# Patient Record
Sex: FEMALE | Race: WHITE | NOT HISPANIC OR LATINO
[De-identification: names, ages, dates, MRNs, and addresses within clinical notes are randomized per-mention and may not be internally consistent; named-entity substitution may affect disease eponyms.]

---

## 2017-01-16 ENCOUNTER — APPOINTMENT (OUTPATIENT)
Dept: CARDIOLOGY | Facility: CLINIC | Age: 72
End: 2017-01-16

## 2017-07-17 ENCOUNTER — APPOINTMENT (OUTPATIENT)
Dept: CARDIOLOGY | Facility: CLINIC | Age: 72
End: 2017-07-17

## 2017-07-25 ENCOUNTER — APPOINTMENT (OUTPATIENT)
Dept: CARDIOLOGY | Facility: CLINIC | Age: 72
End: 2017-07-25

## 2017-07-31 ENCOUNTER — APPOINTMENT (OUTPATIENT)
Dept: CARDIOLOGY | Facility: CLINIC | Age: 72
End: 2017-07-31
Payer: MEDICARE

## 2017-07-31 PROCEDURE — 99214 OFFICE O/P EST MOD 30 MIN: CPT

## 2018-01-16 ENCOUNTER — APPOINTMENT (OUTPATIENT)
Dept: CARDIOLOGY | Facility: CLINIC | Age: 73
End: 2018-01-16

## 2018-01-18 ENCOUNTER — APPOINTMENT (OUTPATIENT)
Dept: CARDIOLOGY | Facility: CLINIC | Age: 73
End: 2018-01-18
Payer: MEDICARE

## 2018-01-18 ENCOUNTER — NON-APPOINTMENT (OUTPATIENT)
Age: 73
End: 2018-01-18

## 2018-01-18 VITALS
HEART RATE: 66 BPM | WEIGHT: 147 LBS | SYSTOLIC BLOOD PRESSURE: 106 MMHG | BODY MASS INDEX: 23.63 KG/M2 | DIASTOLIC BLOOD PRESSURE: 70 MMHG | HEIGHT: 66 IN

## 2018-01-18 DIAGNOSIS — Z78.9 OTHER SPECIFIED HEALTH STATUS: ICD-10-CM

## 2018-01-18 DIAGNOSIS — Z81.1 FAMILY HISTORY OF ALCOHOL ABUSE AND DEPENDENCE: ICD-10-CM

## 2018-01-18 DIAGNOSIS — Z84.89 FAMILY HISTORY OF OTHER SPECIFIED CONDITIONS: ICD-10-CM

## 2018-01-18 PROCEDURE — 93000 ELECTROCARDIOGRAM COMPLETE: CPT

## 2018-01-18 PROCEDURE — 99214 OFFICE O/P EST MOD 30 MIN: CPT

## 2018-01-18 RX ORDER — UBIDECARENONE/VIT E ACET 100MG-5
1000 CAPSULE ORAL
Refills: 0 | Status: ACTIVE | COMMUNITY

## 2018-03-30 ENCOUNTER — NON-APPOINTMENT (OUTPATIENT)
Age: 73
End: 2018-03-30

## 2018-03-30 ENCOUNTER — APPOINTMENT (OUTPATIENT)
Dept: CARDIOLOGY | Facility: CLINIC | Age: 73
End: 2018-03-30
Payer: MEDICARE

## 2018-03-30 VITALS
HEIGHT: 66.5 IN | SYSTOLIC BLOOD PRESSURE: 110 MMHG | HEART RATE: 76 BPM | BODY MASS INDEX: 23.67 KG/M2 | WEIGHT: 149 LBS | DIASTOLIC BLOOD PRESSURE: 64 MMHG

## 2018-03-30 PROCEDURE — 99214 OFFICE O/P EST MOD 30 MIN: CPT

## 2018-03-30 PROCEDURE — 93000 ELECTROCARDIOGRAM COMPLETE: CPT

## 2018-03-30 RX ORDER — OFLOXACIN 3 MG/ML
0.3 SOLUTION/ DROPS OPHTHALMIC
Qty: 10 | Refills: 0 | Status: DISCONTINUED | COMMUNITY
Start: 2017-12-12

## 2018-03-30 RX ORDER — OSELTAMIVIR PHOSPHATE 75 MG/1
75 CAPSULE ORAL
Qty: 10 | Refills: 0 | Status: DISCONTINUED | COMMUNITY
Start: 2018-01-23

## 2018-03-30 RX ORDER — PREDNISOLONE ACETATE 10 MG/ML
1 SUSPENSION/ DROPS OPHTHALMIC
Qty: 15 | Refills: 0 | Status: DISCONTINUED | COMMUNITY
Start: 2017-12-12

## 2018-03-30 RX ORDER — BRIMONIDINE TARTRATE 1 MG/ML
0.1 SOLUTION/ DROPS OPHTHALMIC
Qty: 5 | Refills: 0 | Status: DISCONTINUED | COMMUNITY
Start: 2017-11-16

## 2018-04-16 ENCOUNTER — RX RENEWAL (OUTPATIENT)
Age: 73
End: 2018-04-16

## 2018-05-16 ENCOUNTER — APPOINTMENT (OUTPATIENT)
Dept: CARDIOLOGY | Facility: CLINIC | Age: 73
End: 2018-05-16
Payer: MEDICARE

## 2018-05-16 VITALS
OXYGEN SATURATION: 95 % | HEART RATE: 66 BPM | DIASTOLIC BLOOD PRESSURE: 60 MMHG | HEIGHT: 66.5 IN | WEIGHT: 150 LBS | BODY MASS INDEX: 23.82 KG/M2 | SYSTOLIC BLOOD PRESSURE: 100 MMHG

## 2018-05-16 DIAGNOSIS — Z86.19 PERSONAL HISTORY OF OTHER INFECTIOUS AND PARASITIC DISEASES: ICD-10-CM

## 2018-05-16 DIAGNOSIS — H26.9 UNSPECIFIED CATARACT: ICD-10-CM

## 2018-05-16 DIAGNOSIS — Z86.79 PERSONAL HISTORY OF OTHER DISEASES OF THE CIRCULATORY SYSTEM: ICD-10-CM

## 2018-05-16 PROCEDURE — 99214 OFFICE O/P EST MOD 30 MIN: CPT

## 2018-05-16 RX ORDER — PRAVASTATIN SODIUM 40 MG/1
40 TABLET ORAL
Refills: 0 | Status: DISCONTINUED | COMMUNITY
End: 2018-05-16

## 2018-11-21 ENCOUNTER — APPOINTMENT (OUTPATIENT)
Dept: CARDIOLOGY | Facility: CLINIC | Age: 73
End: 2018-11-21
Payer: MEDICARE

## 2018-11-21 VITALS
HEART RATE: 77 BPM | WEIGHT: 151 LBS | HEIGHT: 66.5 IN | BODY MASS INDEX: 23.98 KG/M2 | SYSTOLIC BLOOD PRESSURE: 112 MMHG | OXYGEN SATURATION: 99 % | DIASTOLIC BLOOD PRESSURE: 64 MMHG

## 2018-11-21 PROCEDURE — 99214 OFFICE O/P EST MOD 30 MIN: CPT

## 2018-11-21 RX ORDER — ALBUTEROL SULFATE 90 UG/1
108 (90 BASE) AEROSOL, METERED RESPIRATORY (INHALATION)
Qty: 18 | Refills: 0 | Status: DISCONTINUED | COMMUNITY
Start: 2018-01-23 | End: 2018-11-21

## 2018-11-21 NOTE — HISTORY OF PRESENT ILLNESS
[FreeTextEntry1] : \par •essential hypertension, stable. no chf, no ckd, non smoker\par \par \par •Dyslipidemia. Take pravastatin inetermitantly along with lifestyle modifications.  Notes minor tolerable spasms in her legs and feet which last for "seconds". \par \par \par •Murmur related to increased valvular regurgitation. No CHF. non rheumatic valvular abnormality. Physiologic.\par \par \par •hypothyroidism on synthroid followed by endocrionology Dr. martinez stable evaluation in 1/16\par \par \par

## 2018-11-21 NOTE — REASON FOR VISIT
[Follow-Up - Clinic] : a clinic follow-up of [Hyperlipidemia] : hyperlipidemia [Hypertension] : hypertension [Palpitations] : palpitations [FreeTextEntry1] : 73-year-old. \par  Intermittent palpitations. Mainly, when she is stressed out. No associated symptoms of chest pain, shortness of breath, dizziness, or syncopal episode.\par No chest pain.\par Very active.\par Stable. Diet.\par Stable. Weight\par Stable. CPAP and management\par

## 2018-11-21 NOTE — PHYSICAL EXAM
[General Appearance - Well Developed] : well developed [Normal Appearance] : normal appearance [Well Groomed] : well groomed [General Appearance - Well Nourished] : well nourished [No Deformities] : no deformities [General Appearance - In No Acute Distress] : no acute distress [FreeTextEntry1] : no JVD [Respiration, Rhythm And Depth] : normal respiratory rhythm and effort [Exaggerated Use Of Accessory Muscles For Inspiration] : no accessory muscle use [Auscultation Breath Sounds / Voice Sounds] : lungs were clear to auscultation bilaterally [Heart Rate And Rhythm] : heart rate and rhythm were normal [Heart Sounds] : normal S1 and S2 [Murmurs] : no murmurs present [Abdomen Soft] : soft [Abdomen Tenderness] : non-tender [Abdomen Mass (___ Cm)] : no abdominal mass palpated [Abnormal Walk] : normal gait [Gait - Sufficient For Exercise Testing] : the gait was sufficient for exercise testing [Nail Clubbing] : no clubbing of the fingernails [Cyanosis, Localized] : no localized cyanosis [Petechial Hemorrhages (___cm)] : no petechial hemorrhages [Skin Color & Pigmentation] : normal skin color and pigmentation [] : no rash [No Venous Stasis] : no venous stasis [Skin Lesions] : no skin lesions [No Skin Ulcers] : no skin ulcer [No Xanthoma] : no  xanthoma was observed [Oriented To Time, Place, And Person] : oriented to person, place, and time [Affect] : the affect was normal [Mood] : the mood was normal [No Anxiety] : not feeling anxious

## 2018-11-21 NOTE — DISCUSSION/SUMMARY
[FreeTextEntry1] : #1CBC CMP, fasting lipid panel, hemoglobin A1c, TSH, urinalysis\par #2 essential hypertension. Now, very well-controlled p.o. of any medications. Continue like some modification.\par #3 hyperlipidemia. Continue statin therapy. Low saturated fat, carbohydrate intake. Taking it every other day. Fasting lipid panel is ordered.\par #4 hypothyroidism. followup stable TSH.\par #5 sleep apnea. A very well-controlled. Continue CPAP use.\par #6 preventive care reviewed. She will follow with gastroenterology. Ophthalmology. Endocrinology. And her gynecologist.\par \par Counseling regarding low saturated fat, low carbohydrate intake was reviewed. Active lifestyle and regular. Exercise is along with weight maintenance is advised.\par Counseling regarding low salt diet, active lifestyle, regular exercise, and weight maintenance was reviewed.\par \par All the above were at length reviewed. Answered all the questions. Thank you very much for this kind referral. Please do not hesitate to give me a call for any question.\par Part of this transcription was done with voice recognition software and phonetically similar errors are common. I apologize for that. Please donot hesitate to call for any questions due to above.\par \par Followup is planned 6 months

## 2018-11-21 NOTE — REVIEW OF SYSTEMS
[Shortness Of Breath] : no shortness of breath [Dyspnea on exertion] : not dyspnea during exertion [Chest  Pressure] : no chest pressure [Chest Pain] : no chest pain [Lower Ext Edema] : no extremity edema [Leg Claudication] : no intermittent leg claudication [Palpitations] : palpitations [Negative] : Heme/Lymph

## 2018-11-21 NOTE — ASSESSMENT
[FreeTextEntry1] : Echo 5/24/16 EF 60%. \par \par EKG requested by today 3/30/18 NSR 72bpm\par \par Preventive care. She did receive flu vaccine. She is seeing gynecologist be a colonoscopy 2014. Pneumonia vaccine 2014 and subsequently 2017 ( Prevnar-13).\par Labs from May 18, 2018 reviewed

## 2019-03-12 ENCOUNTER — APPOINTMENT (OUTPATIENT)
Dept: CARDIOLOGY | Facility: CLINIC | Age: 74
End: 2019-03-12
Payer: MEDICARE

## 2019-03-12 VITALS
HEART RATE: 62 BPM | SYSTOLIC BLOOD PRESSURE: 118 MMHG | HEIGHT: 66 IN | WEIGHT: 150 LBS | BODY MASS INDEX: 24.11 KG/M2 | OXYGEN SATURATION: 99 % | DIASTOLIC BLOOD PRESSURE: 68 MMHG

## 2019-03-12 PROCEDURE — 99214 OFFICE O/P EST MOD 30 MIN: CPT

## 2019-03-12 NOTE — REVIEW OF SYSTEMS
[see HPI] : see HPI [Shortness Of Breath] : no shortness of breath [Dyspnea on exertion] : not dyspnea during exertion [Chest  Pressure] : no chest pressure [Chest Pain] : no chest pain [Lower Ext Edema] : lower extremity edema [Leg Claudication] : no intermittent leg claudication [Palpitations] : no palpitations [Joint Pain] : joint pain [Joint Swelling] : joint swelling [Joint Stiffness] : joint stiffness [Negative] : Heme/Lymph

## 2019-03-12 NOTE — PHYSICAL EXAM
[General Appearance - Well Developed] : well developed [Normal Appearance] : normal appearance [Well Groomed] : well groomed [General Appearance - Well Nourished] : well nourished [No Deformities] : no deformities [General Appearance - In No Acute Distress] : no acute distress [Respiration, Rhythm And Depth] : normal respiratory rhythm and effort [Exaggerated Use Of Accessory Muscles For Inspiration] : no accessory muscle use [Auscultation Breath Sounds / Voice Sounds] : lungs were clear to auscultation bilaterally [Heart Rate And Rhythm] : heart rate and rhythm were normal [Heart Sounds] : normal S1 and S2 [Murmurs] : no murmurs present [FreeTextEntry1] : Left calf region  varicose veins noted. A mild swelling of the medial aspect of left knee. No signs of infection. No signs of superficial DVT. No calf tenderness. [Abdomen Soft] : soft [Abdomen Tenderness] : non-tender [Abdomen Mass (___ Cm)] : no abdominal mass palpated [Abnormal Walk] : normal gait [Gait - Sufficient For Exercise Testing] : the gait was sufficient for exercise testing [Nail Clubbing] : no clubbing of the fingernails [Cyanosis, Localized] : no localized cyanosis [Petechial Hemorrhages (___cm)] : no petechial hemorrhages [Skin Color & Pigmentation] : normal skin color and pigmentation [] : no rash [No Venous Stasis] : no venous stasis [Skin Lesions] : no skin lesions [No Skin Ulcers] : no skin ulcer [No Xanthoma] : no  xanthoma was observed [Oriented To Time, Place, And Person] : oriented to person, place, and time [Affect] : the affect was normal [Mood] : the mood was normal [No Anxiety] : not feeling anxious

## 2019-03-12 NOTE — DISCUSSION/SUMMARY
[FreeTextEntry1] : Left lower extremity swelling in the calf region, probably related to varicose veins or synovial cyst. Recommended left lower extremity venous Doppler study if no significant venous abnormality, then to consider orthopedic evaluation.\par If any significant worsening recommended to go to the emergency room .\par Rest of the management as discussed in the past.

## 2019-03-12 NOTE — REASON FOR VISIT
[Acute Exacerbation] : an acute exacerbation of [FreeTextEntry1] : 72 years old. Comes in for urgent evaluation, as well as 2 months. She has left calf swelling without redness, more in association, medial aspect of her left knee.\par There is no rash. No claudication. Intermittent severe atypia, more week. Ambulation. No joint swelling. Does she does have arthritic pain with movement of the left knee and walking.\par It is progressive.\par No ankle swelling. No shortness of breath, PND, orthopnea.

## 2019-03-15 ENCOUNTER — APPOINTMENT (OUTPATIENT)
Dept: CARDIOLOGY | Facility: CLINIC | Age: 74
End: 2019-03-15

## 2019-04-04 ENCOUNTER — RX RENEWAL (OUTPATIENT)
Age: 74
End: 2019-04-04

## 2019-05-16 ENCOUNTER — NON-APPOINTMENT (OUTPATIENT)
Age: 74
End: 2019-05-16

## 2019-05-16 ENCOUNTER — APPOINTMENT (OUTPATIENT)
Dept: CARDIOLOGY | Facility: CLINIC | Age: 74
End: 2019-05-16
Payer: MEDICARE

## 2019-05-16 VITALS
DIASTOLIC BLOOD PRESSURE: 64 MMHG | BODY MASS INDEX: 23.78 KG/M2 | WEIGHT: 148 LBS | HEIGHT: 66 IN | HEART RATE: 74 BPM | OXYGEN SATURATION: 99 % | SYSTOLIC BLOOD PRESSURE: 108 MMHG

## 2019-05-16 PROCEDURE — 93000 ELECTROCARDIOGRAM COMPLETE: CPT

## 2019-05-16 PROCEDURE — 99214 OFFICE O/P EST MOD 30 MIN: CPT

## 2019-05-16 RX ORDER — PRAVASTATIN SODIUM 40 MG/1
40 TABLET ORAL
Qty: 45 | Refills: 3 | Status: DISCONTINUED | COMMUNITY
Start: 2018-04-16 | End: 2019-05-16

## 2019-05-16 NOTE — REVIEW OF SYSTEMS
[Shortness Of Breath] : no shortness of breath [Dyspnea on exertion] : not dyspnea during exertion [Chest Pain] : no chest pain [Chest  Pressure] : no chest pressure [Lower Ext Edema] : no extremity edema [Leg Claudication] : no intermittent leg claudication [Palpitations] : palpitations [Negative] : Heme/Lymph

## 2019-05-16 NOTE — DISCUSSION/SUMMARY
[FreeTextEntry1] : #1CBC CMP, fasting lipid panel, hemoglobin A1c, TSH, urinalysis\par #2 essential hypertension. Now, very well-controlled off  any medications. Continue like some modification.\par #3 hyperlipidemia. She does not want to take statin therapy because of myalgia. She will continue to follow dietary restrictions and follow up on labs in one year. She understands risks and benefits. She does not have any active cardiovascular problems\par #4 hypothyroidism. followup stable TSH.\par #5 sleep apnea. A very well-controlled. Continue CPAP use.\par #6 preventive care reviewed. She will follow with gastroenterology. Ophthalmology. Endocrinology. And her gynecologist.\par \par Counseling regarding low saturated fat, low carbohydrate intake was reviewed. Active lifestyle and regular. Exercise is along with weight maintenance is advised.\par Counseling regarding low salt diet, active lifestyle, regular exercise, and weight maintenance was reviewed.\par \par All the above were at length reviewed. Answered all the questions. Thank you very much for this kind referral. Please do not hesitate to give me a call for any question.\par Part of this transcription was done with voice recognition software and phonetically similar errors are common. I apologize for that. Please donot hesitate to call for any questions due to above.\par \par Followup is planned 6 months

## 2019-05-16 NOTE — REASON FOR VISIT
[Follow-Up - Clinic] : a clinic follow-up of [Hypertension] : hypertension [Hyperlipidemia] : hyperlipidemia [Palpitations] : palpitations [FreeTextEntry1] : 73-year-old female comes in for followup consultation. Her left leg pain has improved.\par She has Baker's cyst B. and probable varicose veins.\par She was seen by orthopedic surgery. No further evaluation needed.\par She ultrasound, which showed Baker's cyst. No DVT.\par She is here to review her labs.\par  Intermittent palpitations. Mainly, when she is stressed out. No associated symptoms of chest pain, shortness of breath, dizziness, or syncopal episode.\par No chest pain.\par Very active.\par Stable. Diet.\par Stable. Weight\par Stable. CPAP and management\par

## 2019-05-16 NOTE — ASSESSMENT
[FreeTextEntry1] : Echo 5/24/16 EF 60%. \par \par EKG requested by today 3/30/18 NSR 72bpm\par \par Preventive care. She did receive flu vaccine. She is seeing gynecologist be a colonoscopy 2014. Pneumonia vaccine 2014 and subsequently 2017 ( Prevnar-13).\par Labs from May 18, 2018 reviewed\par \par Reviewed on May 16, 2019\par EKG TM is 16-17. Normal sinus rhythm.\par Labs were all reviewed that showed stable CBC, CMP, hemoglobin A1c, urinalysis.\par Her thyroid profile is being followed with

## 2019-05-16 NOTE — PHYSICAL EXAM
[General Appearance - Well Developed] : well developed [Normal Appearance] : normal appearance [Well Groomed] : well groomed [General Appearance - Well Nourished] : well nourished [No Deformities] : no deformities [General Appearance - In No Acute Distress] : no acute distress [FreeTextEntry1] : no JVD [Respiration, Rhythm And Depth] : normal respiratory rhythm and effort [Exaggerated Use Of Accessory Muscles For Inspiration] : no accessory muscle use [Auscultation Breath Sounds / Voice Sounds] : lungs were clear to auscultation bilaterally [Heart Rate And Rhythm] : heart rate and rhythm were normal [Heart Sounds] : normal S1 and S2 [Murmurs] : no murmurs present [Abdomen Soft] : soft [Abdomen Tenderness] : non-tender [Abdomen Mass (___ Cm)] : no abdominal mass palpated [Gait - Sufficient For Exercise Testing] : the gait was sufficient for exercise testing [Abnormal Walk] : normal gait [Nail Clubbing] : no clubbing of the fingernails [Petechial Hemorrhages (___cm)] : no petechial hemorrhages [Cyanosis, Localized] : no localized cyanosis [Skin Color & Pigmentation] : normal skin color and pigmentation [No Venous Stasis] : no venous stasis [] : no rash [Skin Lesions] : no skin lesions [No Xanthoma] : no  xanthoma was observed [No Skin Ulcers] : no skin ulcer [Oriented To Time, Place, And Person] : oriented to person, place, and time [Affect] : the affect was normal [Mood] : the mood was normal [No Anxiety] : not feeling anxious

## 2019-11-13 ENCOUNTER — APPOINTMENT (OUTPATIENT)
Dept: CARDIOLOGY | Facility: CLINIC | Age: 74
End: 2019-11-13
Payer: MEDICARE

## 2019-11-13 VITALS
HEART RATE: 68 BPM | SYSTOLIC BLOOD PRESSURE: 132 MMHG | OXYGEN SATURATION: 97 % | WEIGHT: 151 LBS | BODY MASS INDEX: 24.27 KG/M2 | HEIGHT: 66 IN | DIASTOLIC BLOOD PRESSURE: 60 MMHG

## 2019-11-13 PROCEDURE — 99214 OFFICE O/P EST MOD 30 MIN: CPT

## 2019-11-13 NOTE — HISTORY OF PRESENT ILLNESS
[FreeTextEntry1] : HPI:\par •essential hypertension, stable. no chf, no ckd, non smoker\par \par •Dyslipidemia. Take pravastatin inetermitantly along with lifestyle modifications.  Notes minor tolerable spasms in her legs and feet which last for "seconds". \par \par •Murmur related to increased valvular regurgitation. No CHF. non rheumatic valvular abnormality. Physiologic.\par \par •hypothyroidism on synthroid followed by endocrionology Dr. martinez stable evaluation in 1/16\par \par Significant osteoarthritis. Being followed by orthopedic surgery. Bilateral knee. Baker's cyst.\par \par Preventive care. She has had her flu vaccine this year only. She had 2 pneumonia vaccines. She follows with gynecologist on a regular basis. She had colonoscopy in 2019

## 2019-11-13 NOTE — DISCUSSION/SUMMARY
[FreeTextEntry1] : #1CBC CMP, lipid panel, TSH ordered\par #2 essential hypertension. Now, very well-controlled off  any medications. Continue like some modification.\par #3 hyperlipidemia. She does not want to take statin therapy because of myalgia. She will continue to follow dietary restrictions and follow up on labs in one year. She understands risks and benefits. She does not have any active cardiovascular problems\par #4 hypothyroidism. followup stable TSH.\par #5 sleep apnea. A very well-controlled. Continue CPAP use.\par #6 preventive care reviewed. She will follow with gastroenterology. Ophthalmology. Endocrinology. And her gynecologist.\par #7 significant osteoarthritis. Right knee swelling. Effusion. Evaluation management with orthopedic surgery as planned tomorrow.\par \par Counseling regarding low saturated fat, low carbohydrate intake was reviewed. Active lifestyle and regular. Exercise is along with weight maintenance is advised.\par Counseling regarding low salt diet, active lifestyle, regular exercise, and weight maintenance was reviewed.\par \par All the above were at length reviewed. Answered all the questions. Thank you very much for this kind referral. Please do not hesitate to give me a call for any question.\par Part of this transcription was done with voice recognition software and phonetically similar errors are common. I apologize for that. Please donot hesitate to call for any questions due to above.\par \par Followup is planned 6 months

## 2019-11-13 NOTE — REASON FOR VISIT
[Follow-Up - Clinic] : a clinic follow-up of [Hyperlipidemia] : hyperlipidemia [Hypertension] : hypertension [Palpitations] : palpitations [FreeTextEntry1] : Right knee pain and swelling. She is to see orthopedic surgery. Limiting her functional status. Other than that no complaint of chest pain, shortness of breath, PND, orthopnea.\par No palpitation, dizziness, lightheadedness.\par Stable. Weight.\par No fever, chills, cough, or I. there is\par Very active.\par Stable. Diet.\par Stable. Weight\par Stable. CPAP and management\par

## 2019-11-13 NOTE — ASSESSMENT
[FreeTextEntry1] : Echo 5/24/16 EF 60%. \par \par EKG requested by today 3/30/18 NSR 72bpm\par \par Preventive care. She did receive flu vaccine. She is seeing gynecologist be a colonoscopy 2014. Pneumonia vaccine 2014 and subsequently 2017 ( Prevnar-13).\par Labs from May 18, 2018 reviewed\par \par Reviewed on May 16, 2019\par EKG. Normal sinus rhythm.\par Labs were all reviewed that showed stable CBC, CMP, hemoglobin A1c, urinalysis.\par Her thyroid profile is being followed with\par \par Reviewed on November 13, 2019.\par Labs May 2019 were reviewed

## 2019-11-13 NOTE — PHYSICAL EXAM
[General Appearance - Well Developed] : well developed [Well Groomed] : well groomed [General Appearance - Well Nourished] : well nourished [Normal Appearance] : normal appearance [General Appearance - In No Acute Distress] : no acute distress [No Deformities] : no deformities [Respiration, Rhythm And Depth] : normal respiratory rhythm and effort [Exaggerated Use Of Accessory Muscles For Inspiration] : no accessory muscle use [Auscultation Breath Sounds / Voice Sounds] : lungs were clear to auscultation bilaterally [Heart Sounds] : normal S1 and S2 [Heart Rate And Rhythm] : heart rate and rhythm were normal [Murmurs] : no murmurs present [Abdomen Soft] : soft [Abdomen Tenderness] : non-tender [Abdomen Mass (___ Cm)] : no abdominal mass palpated [Gait - Sufficient For Exercise Testing] : the gait was sufficient for exercise testing [Abnormal Walk] : normal gait [Nail Clubbing] : no clubbing of the fingernails [Skin Color & Pigmentation] : normal skin color and pigmentation [] : no rash [No Venous Stasis] : no venous stasis [Skin Lesions] : no skin lesions [No Xanthoma] : no  xanthoma was observed [No Skin Ulcers] : no skin ulcer [Oriented To Time, Place, And Person] : oriented to person, place, and time [Affect] : the affect was normal [Mood] : the mood was normal [No Anxiety] : not feeling anxious [Cyanosis, Localized] : no localized cyanosis [FreeTextEntry1] : Right knee swelling and tenderness

## 2019-11-13 NOTE — REVIEW OF SYSTEMS
[Palpitations] : palpitations [Negative] : Heme/Lymph [Shortness Of Breath] : no shortness of breath [Dyspnea on exertion] : not dyspnea during exertion [Chest  Pressure] : no chest pressure [Chest Pain] : no chest pain [Lower Ext Edema] : no extremity edema [Leg Claudication] : no intermittent leg claudication

## 2020-08-10 ENCOUNTER — NON-APPOINTMENT (OUTPATIENT)
Age: 75
End: 2020-08-10

## 2020-08-10 ENCOUNTER — APPOINTMENT (OUTPATIENT)
Dept: CARDIOLOGY | Facility: CLINIC | Age: 75
End: 2020-08-10
Payer: MEDICARE

## 2020-08-10 VITALS
SYSTOLIC BLOOD PRESSURE: 136 MMHG | TEMPERATURE: 97.1 F | HEIGHT: 66 IN | BODY MASS INDEX: 23.78 KG/M2 | HEART RATE: 68 BPM | OXYGEN SATURATION: 97 % | WEIGHT: 148 LBS | DIASTOLIC BLOOD PRESSURE: 76 MMHG

## 2020-08-10 PROCEDURE — 93000 ELECTROCARDIOGRAM COMPLETE: CPT

## 2020-08-10 PROCEDURE — 99214 OFFICE O/P EST MOD 30 MIN: CPT

## 2020-08-10 NOTE — REVIEW OF SYSTEMS
[Palpitations] : palpitations [Negative] : Heme/Lymph [see HPI] : see HPI [Joint Pain] : joint pain [Joint Swelling] : joint swelling [Joint Stiffness] : joint stiffness [Shortness Of Breath] : no shortness of breath [Dyspnea on exertion] : not dyspnea during exertion [Chest  Pressure] : no chest pressure [Chest Pain] : no chest pain [Lower Ext Edema] : no extremity edema [Leg Claudication] : no intermittent leg claudication

## 2020-08-10 NOTE — PHYSICAL EXAM
[General Appearance - Well Developed] : well developed [Normal Appearance] : normal appearance [Well Groomed] : well groomed [General Appearance - Well Nourished] : well nourished [No Deformities] : no deformities [General Appearance - In No Acute Distress] : no acute distress [Respiration, Rhythm And Depth] : normal respiratory rhythm and effort [Exaggerated Use Of Accessory Muscles For Inspiration] : no accessory muscle use [Auscultation Breath Sounds / Voice Sounds] : lungs were clear to auscultation bilaterally [Heart Rate And Rhythm] : heart rate and rhythm were normal [Heart Sounds] : normal S1 and S2 [Murmurs] : no murmurs present [Abdomen Tenderness] : non-tender [Abdomen Soft] : soft [Abdomen Mass (___ Cm)] : no abdominal mass palpated [Abnormal Walk] : normal gait [Gait - Sufficient For Exercise Testing] : the gait was sufficient for exercise testing [Nail Clubbing] : no clubbing of the fingernails [Cyanosis, Localized] : no localized cyanosis [No Venous Stasis] : no venous stasis [Skin Color & Pigmentation] : normal skin color and pigmentation [] : no rash [Skin Lesions] : no skin lesions [No Skin Ulcers] : no skin ulcer [No Xanthoma] : no  xanthoma was observed [Oriented To Time, Place, And Person] : oriented to person, place, and time [Affect] : the affect was normal [No Anxiety] : not feeling anxious [Mood] : the mood was normal [Arterial Pulses Normal] : the arterial pulses were normal [Veins - Varicosity Changes] : no varicosital changes were noted in the lower extremities [FreeTextEntry1] : Right knee swelling and tenderness

## 2020-08-10 NOTE — PHYSICAL EXAM
[General Appearance - Well Developed] : well developed [Well Groomed] : well groomed [Normal Appearance] : normal appearance [General Appearance - Well Nourished] : well nourished [General Appearance - In No Acute Distress] : no acute distress [No Deformities] : no deformities [Respiration, Rhythm And Depth] : normal respiratory rhythm and effort [Exaggerated Use Of Accessory Muscles For Inspiration] : no accessory muscle use [Auscultation Breath Sounds / Voice Sounds] : lungs were clear to auscultation bilaterally [Heart Rate And Rhythm] : heart rate and rhythm were normal [Heart Sounds] : normal S1 and S2 [Murmurs] : no murmurs present [Abdomen Soft] : soft [Abdomen Tenderness] : non-tender [Abdomen Mass (___ Cm)] : no abdominal mass palpated [Abnormal Walk] : normal gait [Gait - Sufficient For Exercise Testing] : the gait was sufficient for exercise testing [Nail Clubbing] : no clubbing of the fingernails [Cyanosis, Localized] : no localized cyanosis [Skin Color & Pigmentation] : normal skin color and pigmentation [No Venous Stasis] : no venous stasis [] : no rash [No Skin Ulcers] : no skin ulcer [No Xanthoma] : no  xanthoma was observed [Skin Lesions] : no skin lesions [Oriented To Time, Place, And Person] : oriented to person, place, and time [Affect] : the affect was normal [Mood] : the mood was normal [No Anxiety] : not feeling anxious [Arterial Pulses Normal] : the arterial pulses were normal [Veins - Varicosity Changes] : no varicosital changes were noted in the lower extremities [FreeTextEntry1] : Right knee swelling and tenderness

## 2020-08-10 NOTE — REVIEW OF SYSTEMS
[Palpitations] : palpitations [Negative] : Heme/Lymph [see HPI] : see HPI [Joint Pain] : joint pain [Joint Swelling] : joint swelling [Joint Stiffness] : joint stiffness [Shortness Of Breath] : no shortness of breath [Dyspnea on exertion] : not dyspnea during exertion [Chest  Pressure] : no chest pressure [Chest Pain] : no chest pain [Leg Claudication] : no intermittent leg claudication [Lower Ext Edema] : no extremity edema

## 2020-08-27 ENCOUNTER — APPOINTMENT (OUTPATIENT)
Dept: CARDIOLOGY | Facility: CLINIC | Age: 75
End: 2020-08-27
Payer: MEDICARE

## 2020-08-27 PROCEDURE — 93306 TTE W/DOPPLER COMPLETE: CPT

## 2020-11-10 ENCOUNTER — APPOINTMENT (OUTPATIENT)
Dept: CARDIOLOGY | Facility: CLINIC | Age: 75
End: 2020-11-10
Payer: MEDICARE

## 2020-11-10 VITALS
BODY MASS INDEX: 24.92 KG/M2 | WEIGHT: 146 LBS | DIASTOLIC BLOOD PRESSURE: 64 MMHG | HEART RATE: 72 BPM | SYSTOLIC BLOOD PRESSURE: 104 MMHG | OXYGEN SATURATION: 96 % | HEIGHT: 64 IN

## 2020-11-10 DIAGNOSIS — M79.89 OTHER SPECIFIED SOFT TISSUE DISORDERS: ICD-10-CM

## 2020-11-10 DIAGNOSIS — R01.1 CARDIAC MURMUR, UNSPECIFIED: ICD-10-CM

## 2020-11-10 DIAGNOSIS — Z87.898 PERSONAL HISTORY OF OTHER SPECIFIED CONDITIONS: ICD-10-CM

## 2020-11-10 PROCEDURE — 99214 OFFICE O/P EST MOD 30 MIN: CPT

## 2020-11-10 RX ORDER — ESTRADIOL 0.1 MG/G
0.1 CREAM VAGINAL
Qty: 42 | Refills: 0 | Status: DISCONTINUED | COMMUNITY
Start: 2019-01-18 | End: 2020-11-10

## 2020-11-10 NOTE — REVIEW OF SYSTEMS
[see HPI] : see HPI [Joint Pain] : joint pain [Joint Swelling] : joint swelling [Joint Stiffness] : joint stiffness [Negative] : Heme/Lymph

## 2020-11-10 NOTE — REASON FOR VISIT
[Follow-Up - Clinic] : a clinic follow-up of [Hyperlipidemia] : hyperlipidemia [Hypertension] : hypertension [FreeTextEntry1] : 75F who presents today in routine office followup for lipid and BP management. \par \par PMH of HTN, HLD, murmur r/t mild VHD, hypothyroidism, OA.\par \par Last seen in August 2020 with mild ankle edema and high blood pressure. She was started on low dose lisinopril/hctz combination which has helped a great amount. Her BP is stable. She does note dizziness when standing quickly after bending. There has been no syncope or near syncope. Renal function and electrolytes are stable per recent labs. \par \par She remains very active, walking and biking at least 30mins a few times per week. Denies exertional chest pain or discomfort. Denies unusual shortness of breath, orthopnea, weight gain, or LE edema. Denies palpitations.

## 2020-11-10 NOTE — PHYSICAL EXAM
[General Appearance - Well Developed] : well developed [Normal Appearance] : normal appearance [Well Groomed] : well groomed [General Appearance - Well Nourished] : well nourished [No Deformities] : no deformities [General Appearance - In No Acute Distress] : no acute distress [FreeTextEntry1] : no JVD [Respiration, Rhythm And Depth] : normal respiratory rhythm and effort [Exaggerated Use Of Accessory Muscles For Inspiration] : no accessory muscle use [Auscultation Breath Sounds / Voice Sounds] : lungs were clear to auscultation bilaterally [Heart Rate And Rhythm] : heart rate and rhythm were normal [Heart Sounds] : normal S1 and S2 [Murmurs] : no murmurs present [Arterial Pulses Normal] : the arterial pulses were normal [Edema] : no peripheral edema present [Abdomen Soft] : soft [Abdomen Tenderness] : non-tender [Abdomen Mass (___ Cm)] : no abdominal mass palpated [Abnormal Walk] : normal gait [Gait - Sufficient For Exercise Testing] : the gait was sufficient for exercise testing [Nail Clubbing] : no clubbing of the fingernails [Cyanosis, Localized] : no localized cyanosis [Skin Color & Pigmentation] : normal skin color and pigmentation [] : no rash [No Venous Stasis] : no venous stasis [Skin Lesions] : no skin lesions [No Skin Ulcers] : no skin ulcer [No Xanthoma] : no  xanthoma was observed [Oriented To Time, Place, And Person] : oriented to person, place, and time [Affect] : the affect was normal [Mood] : the mood was normal [No Anxiety] : not feeling anxious

## 2020-11-10 NOTE — HISTORY OF PRESENT ILLNESS
[FreeTextEntry1] : \par Preventive care:\par  She has had her flu vaccine this year. She had 2 pneumonia vaccines. She follows with gynecologist on a regular basis. Mammogram and US breast negative Oct 2020. She had colonoscopy in 2019.

## 2020-11-10 NOTE — ASSESSMENT
[FreeTextEntry1] : \par Preventive care: She did receive flu vaccine 2020. Pneumonia vaccine 2014 and subsequently 2017 ( Prevnar-13).\par \par Labs 8/25/2020. k 4.2, creat 0.78, [id number]\par 5/21/2020 TSH 2

## 2020-11-10 NOTE — DISCUSSION/SUMMARY
[FreeTextEntry1] : 75F here for routine followup/risk factor management with the following active issues and recommendations:\par \par 1.  Ankle edema, resolved on low dose diuretic.  Lower extremity venous Doppler study in the past negative.  Associated with arthritis and Baker's cyst.  Leg elevation.  Low-salt diet.  Continued regular activity discussed.\par \par #2 essential hypertension, much improved with lisinopril/hydrochlorothiazide 10/12 .5 mg half tablet daily. Mild positional dizziness is noted. I encouraged increased hydration.   Continue aggressive lifestyle modification again reviewed with her.  Goal would be to keep it less than 130/80.   Continue with low-salt diet lower saturated fat carbohydrate and alcohol intake.\par \par #3 hyperlipidemia. Her LDL is elevated. She does not want to take statin therapy because of myalgia on pravastatin. She will continue to follow dietary restrictions . She understands risks and benefits. She does not have any active cardiovascular problems. I advised that if LDL remains elevated on next labs she would benefit from very low dose of alternative statin and she is amenable to this. \par \par #4 hypothyroidism.  Stable TSH 5/2020. Surveillance monitoring.\par  \par #5 sleep apnea, very well-controlled. Continue CPAP use.\par \par #6 preventive care reviewed. She will follow with gastroenterology. Ophthalmology. Endocrinology. Gynecologist. Flu shot done elsewhere this season. \par \par #7 significant osteoarthritis.  Follow with orthopedic surgery\par \par Counseling regarding low saturated fat, salt and carbohydrate intake was reviewed. Active lifestyle and regular. Exercise along with weight management is advised.\par \par Annual followup or as needed.\par Any questions and concerns were addressed and resolved. \par \par Sincerely,\par \par AUSTIN Powell\par Patients history, testing, and plan reviewed with supervising MD: Dr. Angel Haskins

## 2020-12-14 ENCOUNTER — NON-APPOINTMENT (OUTPATIENT)
Age: 75
End: 2020-12-14

## 2021-07-12 RX ORDER — LISINOPRIL AND HYDROCHLOROTHIAZIDE TABLETS 10; 12.5 MG/1; MG/1
10-12.5 TABLET ORAL DAILY
Qty: 45 | Refills: 3 | Status: DISCONTINUED | COMMUNITY
Start: 2020-08-10 | End: 2021-07-12

## 2021-07-26 ENCOUNTER — RX RENEWAL (OUTPATIENT)
Age: 76
End: 2021-07-26

## 2021-08-03 ENCOUNTER — NON-APPOINTMENT (OUTPATIENT)
Age: 76
End: 2021-08-03

## 2021-08-03 ENCOUNTER — APPOINTMENT (OUTPATIENT)
Dept: CARDIOLOGY | Facility: CLINIC | Age: 76
End: 2021-08-03
Payer: MEDICARE

## 2021-08-03 VITALS
OXYGEN SATURATION: 96 % | HEIGHT: 64 IN | HEART RATE: 64 BPM | WEIGHT: 147 LBS | DIASTOLIC BLOOD PRESSURE: 58 MMHG | SYSTOLIC BLOOD PRESSURE: 102 MMHG | BODY MASS INDEX: 25.1 KG/M2

## 2021-08-03 PROCEDURE — 93000 ELECTROCARDIOGRAM COMPLETE: CPT

## 2021-08-03 PROCEDURE — 99214 OFFICE O/P EST MOD 30 MIN: CPT

## 2021-08-03 NOTE — DISCUSSION/SUMMARY
[FreeTextEntry1] : 75F here for routine followup/risk factor management with the following active issues and recommendations:\par \par 1.  Postural dizziness.  No significant postural hypotension.  Low blood pressure today.  She is taking lisinopril 10 mg every other day.  Symptoms of postural dizziness resolved after discontinuation of hydrochlorothiazide.\par We will decrease lisinopril to 5 mg.  She will continue with her lifestyle modifications.\par She will keep a record of blood pressure at home for the next 10 days.  She will contact us and if persistent lower blood pressure or symptoms related to lower blood pressure we will decrease or discontinue lisinopril altogether.  Goal overall is less than 130/80.  She understands.\par \par #2 essential hypertension, changes as noted above with lisinopril 5 mg daily.  I encouraged increased hydration.   Continue aggressive lifestyle modification again reviewed with her.  Goal would be to keep it less than 130/80.   Continue with low-salt diet lower saturated fat carbohydrate and alcohol intake.\par \par #3 hyperlipidemia.  On low-dose statin.  Tolerating it well.  Improved LDL cholesterol.  Continue aggressive lifestyle and risk factor modifications.  Labs in 6 months ordered\par \par #4 hypothyroidism.  Stable TSH .  Continue Synthroid.\par  \par #5 sleep apnea, very well-controlled. Continue CPAP use.\par \par #6 preventive care reviewed. She will follow with gastroenterology. Ophthalmology. Endocrinology. Gynecologist.  She got her Covid vaccines.\par \par #7 significant osteoarthritis.  Follow-up with specialist\par \par Counseling regarding low saturated fat, salt and carbohydrate intake was reviewed. Active lifestyle and regular. Exercise along with weight management is advised.\par \par All the above were at length reviewed. Answered all the questions. Thank you very much for this kind referral. Please do not hesitate to give me a call for any question.\par Part of this transcription was done with voice recognition software and phonetically similar errors are common. I apologize for that. Please do not hesitate to call for any questions due to above.\par

## 2021-08-03 NOTE — PHYSICAL EXAM
[Well Developed] : well developed [Well Nourished] : well nourished [No Acute Distress] : no acute distress [Normal Conjunctiva] : normal conjunctiva [Normal Venous Pressure] : normal venous pressure [No Carotid Bruit] : no carotid bruit [Normal S1, S2] : normal S1, S2 [No Murmur] : no murmur [No Rub] : no rub [No Gallop] : no gallop [Clear Lung Fields] : clear lung fields [Good Air Entry] : good air entry [No Respiratory Distress] : no respiratory distress  [Soft] : abdomen soft [Non Tender] : non-tender [No Masses/organomegaly] : no masses/organomegaly [Normal Bowel Sounds] : normal bowel sounds [Normal Gait] : normal gait [No Edema] : no edema [No Cyanosis] : no cyanosis [No Clubbing] : no clubbing [No Varicosities] : no varicosities [No Rash] : no rash [No Skin Lesions] : no skin lesions [Moves all extremities] : moves all extremities [No Focal Deficits] : no focal deficits [Normal Speech] : normal speech [Alert and Oriented] : alert and oriented [Normal memory] : normal memory [Normal Radial B/L] : normal radial B/L [Normal PT B/L] : normal PT B/L [Normal DP B/L] : normal DP B/L

## 2021-08-03 NOTE — CARDIOLOGY SUMMARY
[___] : [unfilled] [LVEF ___%] : LVEF [unfilled]% [None] : no pulmonary hypertension [Normal] : normal LA size [Mild] : mild mitral regurgitation [de-identified] : August 3, 2021.  Normal sinus rhythm.  Low voltage [de-identified] : August 27, 2020 EF 65% minimal mitral regurgitation PASP 31

## 2021-08-03 NOTE — REASON FOR VISIT
[Symptom and Test Evaluation] : symptom and test evaluation [Hyperlipidemia] : hyperlipidemia [Hypertension] : hypertension [Other: ____] : [unfilled] [FreeTextEntry1] : 75F who presents today in routine office followup for lipid and BP management. \par \par PMH of HTN, HLD, murmur r/t mild VHD, hypothyroidism, OA.\par \par Since last seen in the office with addition of hydrochlorothiazide to lisinopril she had significant postural dizziness.  Without any near syncopal or syncopal event.  We took her off hydrochlorothiazide and continue with lisinopril 10 mg with that her dizziness has resolved.  She is taking her lisinopril though every other day.\par She does have generalized mild fatigue and intermittent arthralgia based on the weather.\par \par She remains very active, walking, working in the yard and biking at least 30mins a few times per week. Denies exertional chest pain or discomfort. Denies unusual shortness of breath, orthopnea, weight gain, or LE edema. Denies palpitations.  \par She is compliant with her diet.  Does not have any alcohol intake.  She is non-smoker.\par She has stable weight\par Uses CPAP regularly\par No recent hospital admission

## 2021-08-03 NOTE — ASSESSMENT
[FreeTextEntry1] : \par Preventive care: She did receive flu vaccine 2020. Pneumonia vaccine 2014 and subsequently 2017 ( Prevnar-13).\par \par Labs 8/25/2020. k 4.2, creat 0.78, [id number]\par 5/21/2020 TSH 2\par \par Reviewed August 3, 2021\par Labs January 29, 2021 normal LFT HDL 64 LDL 88 total cholesterol 163\par December 14, 2020 normal BMP TSH 2.17 vitamin D 68\par July 26, 2021 normal CBC.  CMP with creatinine 0.78 potassium 4.2 sodium 142 glucose 85 LFT normal TSH 1.69 Lyme titers negative.

## 2021-08-03 NOTE — REVIEW OF SYSTEMS
[Negative] : Heme/Lymph [Fever] : no fever [Headache] : no headache [Weight Gain (___ Lbs)] : no recent weight gain [Chills] : no chills [Feeling Fatigued] : feeling fatigued [Weight Loss (___ Lbs)] : no recent weight loss [Joint Pain] : joint pain [Joint Stiffness] : joint stiffness

## 2021-08-13 ENCOUNTER — NON-APPOINTMENT (OUTPATIENT)
Age: 76
End: 2021-08-13

## 2021-09-01 ENCOUNTER — NON-APPOINTMENT (OUTPATIENT)
Age: 76
End: 2021-09-01

## 2021-09-10 ENCOUNTER — NON-APPOINTMENT (OUTPATIENT)
Age: 76
End: 2021-09-10

## 2021-09-15 ENCOUNTER — RX RENEWAL (OUTPATIENT)
Age: 76
End: 2021-09-15

## 2021-09-17 ENCOUNTER — NON-APPOINTMENT (OUTPATIENT)
Age: 76
End: 2021-09-17

## 2021-09-28 ENCOUNTER — APPOINTMENT (OUTPATIENT)
Dept: CARDIOLOGY | Facility: CLINIC | Age: 76
End: 2021-09-28

## 2022-08-03 ENCOUNTER — APPOINTMENT (OUTPATIENT)
Dept: CARDIOLOGY | Facility: CLINIC | Age: 77
End: 2022-08-03

## 2022-08-03 ENCOUNTER — NON-APPOINTMENT (OUTPATIENT)
Age: 77
End: 2022-08-03

## 2022-08-03 VITALS
DIASTOLIC BLOOD PRESSURE: 60 MMHG | HEART RATE: 69 BPM | WEIGHT: 142 LBS | OXYGEN SATURATION: 97 % | HEIGHT: 64 IN | BODY MASS INDEX: 24.24 KG/M2 | SYSTOLIC BLOOD PRESSURE: 100 MMHG

## 2022-08-03 DIAGNOSIS — M79.10 MYALGIA, UNSPECIFIED SITE: ICD-10-CM

## 2022-08-03 DIAGNOSIS — R07.89 OTHER CHEST PAIN: ICD-10-CM

## 2022-08-03 DIAGNOSIS — Z00.00 ENCOUNTER FOR GENERAL ADULT MEDICAL EXAMINATION W/OUT ABNORMAL FINDINGS: ICD-10-CM

## 2022-08-03 DIAGNOSIS — R42 DIZZINESS AND GIDDINESS: ICD-10-CM

## 2022-08-03 PROCEDURE — 93000 ELECTROCARDIOGRAM COMPLETE: CPT

## 2022-08-03 PROCEDURE — 99214 OFFICE O/P EST MOD 30 MIN: CPT

## 2022-08-03 NOTE — DISCUSSION/SUMMARY
[FreeTextEntry1] : 76 F here for routine followup/risk factor management with the following active issues and recommendations:\par \par 1.  Postural dizziness.  No significant postural hypotension.  Continue present regimen medication\par Echocardiogram and carotid Doppler study to be repeated.\par Continue follow-up blood pressure\par Hydration\par \par #2 essential hypertension, changes as noted above with lisinopril 5 mg daily.  I encouraged increased hydration.   Continue aggressive lifestyle modification again reviewed with her.  Goal would be to keep it less than 130/80.   Continue with low-salt diet lower saturated fat carbohydrate and alcohol intake.\par \par #3 hyperlipidemia.  On low-dose statin.  Tolerating it well.  Improved LDL cholesterol.  Continue aggressive lifestyle and risk factor modifications.  Labs in 6 months ordered\par \par #4 hypothyroidism.  Stable TSH .  Continue Synthroid.\par  \par #5 sleep apnea, very well-controlled. Continue CPAP use.\par \par #6 preventive care reviewed. She will follow with gastroenterology. Ophthalmology. Endocrinology. Gynecologist.  She got her Covid vaccines.\par \par #7 significant osteoarthritis.  Follow-up with specialist\par \par #8 cardiovascular evaluation.  Chest pain.  Probably GI and noncardiac.  Though in presence of her age and other risk factors recommended echocardiogram for LV ejection fraction wall motion valvular evaluation\par Exercise treadmill stress test and labs ordered to assess evaluate and rule out any significant obstructive CAD\par \par Counseling regarding low saturated fat, salt and carbohydrate intake was reviewed. Active lifestyle and regular. Exercise along with weight management is advised.\par \par All the above were at length reviewed. Answered all the questions. Thank you very much for this kind referral. Please do not hesitate to give me a call for any question.\par Part of this transcription was done with voice recognition software and phonetically similar errors are common. I apologize for that. Please do not hesitate to call for any questions due to above.\par

## 2022-08-03 NOTE — ASSESSMENT
[FreeTextEntry1] : \par Preventive care: She did receive flu vaccine 2020. Pneumonia vaccine 2014 and subsequently 2017 ( Prevnar-13).\par \par Labs 8/25/2020. k 4.2, creat 0.78, [id number]\par 5/21/2020 TSH 2\par \par Reviewed August 3, 2021\par Labs January 29, 2021 normal LFT HDL 64 LDL 88 total cholesterol 163\par December 14, 2020 normal BMP TSH 2.17 vitamin D 68\par July 26, 2021 normal CBC.  CMP with creatinine 0.78 potassium 4.2 sodium 142 glucose 85 LFT normal TSH 1.69 Lyme titers negative.\par \par Reviewed on August 3, 2022\par EKG labs reviewed.  Labs from January 3, 2022

## 2022-08-03 NOTE — REASON FOR VISIT
[Symptom and Test Evaluation] : symptom and test evaluation [Hyperlipidemia] : hyperlipidemia [Hypertension] : hypertension [Other: ____] : [unfilled] [FreeTextEntry1] : 76F who presents today in routine office followup for lipid and BP management.  Complain of intermittent leg and arm cramps.\par \par PMH of HTN, HLD, murmur r/t mild VHD, hypothyroidism, OA.\par \par Intermittent dizziness.\par Intermittent substernal chest pain.  Nonexertional.  No radiation.  Last for few minutes.  Often resolves with burping.  No other associated symptoms\par She does have generalized mild fatigue and intermittent arthralgia based on the weather.\par \par She remains very active, walking, working in the yard and biking at least 30mins a few times per week. Denies exertional chest pain or discomfort. Denies unusual shortness of breath, orthopnea, weight gain, or LE edema. Denies palpitations.  \par She is compliant with her diet.  Does not have any alcohol intake.  She is non-smoker.\par She has stable weight\par Uses CPAP regularly\par No recent hospital admission

## 2022-08-03 NOTE — PHYSICAL EXAM
[Well Developed] : well developed [Well Nourished] : well nourished [No Acute Distress] : no acute distress [Normal Venous Pressure] : normal venous pressure [No Carotid Bruit] : no carotid bruit [Normal S1, S2] : normal S1, S2 [No Murmur] : no murmur [No Rub] : no rub [No Gallop] : no gallop [Clear Lung Fields] : clear lung fields [Good Air Entry] : good air entry [No Respiratory Distress] : no respiratory distress  [Soft] : abdomen soft [Normal Gait] : normal gait [No Edema] : no edema [No Cyanosis] : no cyanosis [No Clubbing] : no clubbing [No Varicosities] : no varicosities [Normal Radial B/L] : normal radial B/L [Normal PT B/L] : normal PT B/L [Normal DP B/L] : normal DP B/L [No Rash] : no rash [Moves all extremities] : moves all extremities [Normal Speech] : normal speech [Alert and Oriented] : alert and oriented

## 2022-08-03 NOTE — CARDIOLOGY SUMMARY
[___] : [unfilled] [LVEF ___%] : LVEF [unfilled]% [None] : no pulmonary hypertension [Normal] : normal LA size [Mild] : mild mitral regurgitation [de-identified] : August 3, 2021.  Normal sinus rhythm.  Low voltage\par August 3, 2022 normal sinus rhythm.  Low voltage [de-identified] : August 27, 2020 EF 65% minimal mitral regurgitation PASP 31

## 2022-08-03 NOTE — REVIEW OF SYSTEMS
[Feeling Fatigued] : feeling fatigued [Joint Pain] : joint pain [Joint Stiffness] : joint stiffness [Negative] : Heme/Lymph [Fever] : no fever [Headache] : no headache [Weight Gain (___ Lbs)] : no recent weight gain [Chills] : no chills [Weight Loss (___ Lbs)] : no recent weight loss

## 2022-09-21 ENCOUNTER — APPOINTMENT (OUTPATIENT)
Dept: CARDIOLOGY | Facility: CLINIC | Age: 77
End: 2022-09-21

## 2022-09-21 PROCEDURE — 93306 TTE W/DOPPLER COMPLETE: CPT

## 2022-09-21 PROCEDURE — 93880 EXTRACRANIAL BILAT STUDY: CPT

## 2022-09-21 PROCEDURE — 93015 CV STRESS TEST SUPVJ I&R: CPT

## 2022-10-04 ENCOUNTER — APPOINTMENT (OUTPATIENT)
Dept: ORTHOPEDIC SURGERY | Facility: CLINIC | Age: 77
End: 2022-10-04

## 2022-10-04 PROCEDURE — 20611 DRAIN/INJ JOINT/BURSA W/US: CPT | Mod: RT

## 2022-10-04 NOTE — PROCEDURE
[Right] : of the right [Knee] : knee [Pain] : pain [Inflammation] : inflammation [X-ray evidence of Osteoarthritis on this or prior visit] : x-ray evidence of Osteoarthritis on this or prior visit [Repeat series performed] : repeat series performed [Alcohol] : alcohol [Betadine] : betadine [Ethyl Chloride sprayed topically] : ethyl chloride sprayed topically [Sterile technique used] : sterile technique used [Other: ____] : [unfilled] [#1] : series #1 [] : Patient tolerated procedure well [Call if redness, pain or fever occur] : call if redness, pain or fever occur [Apply ice for 15min out of every hour for the next 12-24 hours as tolerated] : apply ice for 15 minutes out of every hour for the next 12-24 hours as tolerated [Patient was advised to rest the joint(s) for ____ days] : patient was advised to rest the joint(s) for [unfilled] days [Previous OTC use and PT nontherapeutic] : patient has tried OTC's including aspirin, Ibuprofen, Aleve, etc or prescription NSAIDS, and/or exercises at home and/or physical therapy without satisfactory response [Patient had decreased mobility in the joint] : patient had decreased mobility in the joint [Risks, benefits, alternatives discussed / Verbal consent obtained] : the risks benefits, and alternatives have been discussed, and verbal consent was obtained [All ultrasound images have been permanently captured and stored accordingly in our picture archiving and communication system] : All ultrasound images have been permanently captured and stored accordingly in our picture archiving and communication system [Visualization of the needle and placement of injection was performed without complication] : visualization of the needle and placement of injection was performed without complication [de-identified] : 25MG IN 2.5ML SINGLE USE  SYRINGE

## 2022-10-11 ENCOUNTER — APPOINTMENT (OUTPATIENT)
Dept: ORTHOPEDIC SURGERY | Facility: CLINIC | Age: 77
End: 2022-10-11

## 2022-10-11 PROCEDURE — 20611 DRAIN/INJ JOINT/BURSA W/US: CPT | Mod: RT

## 2022-10-11 NOTE — PROCEDURE
[Right] : of the right [Knee] : knee [Pain] : pain [Inflammation] : inflammation [X-ray evidence of Osteoarthritis on this or prior visit] : x-ray evidence of Osteoarthritis on this or prior visit [Alcohol] : alcohol [Betadine] : betadine [Ethyl Chloride sprayed topically] : ethyl chloride sprayed topically [Sterile technique used] : sterile technique used [Other: ____] : [unfilled] [] : Patient tolerated procedure well [Call if redness, pain or fever occur] : call if redness, pain or fever occur [Apply ice for 15min out of every hour for the next 12-24 hours as tolerated] : apply ice for 15 minutes out of every hour for the next 12-24 hours as tolerated [Patient was advised to rest the joint(s) for ____ days] : patient was advised to rest the joint(s) for [unfilled] days [Previous OTC use and PT nontherapeutic] : patient has tried OTC's including aspirin, Ibuprofen, Aleve, etc or prescription NSAIDS, and/or exercises at home and/or physical therapy without satisfactory response [Patient had decreased mobility in the joint] : patient had decreased mobility in the joint [Risks, benefits, alternatives discussed / Verbal consent obtained] : the risks benefits, and alternatives have been discussed, and verbal consent was obtained [#2] : series #2 [All ultrasound images have been permanently captured and stored accordingly in our picture archiving and communication system] : All ultrasound images have been permanently captured and stored accordingly in our picture archiving and communication system [Visualization of the needle and placement of injection was performed without complication] : visualization of the needle and placement of injection was performed without complication [de-identified] : 25MG IN 2.5ML SINGLE USE  SYRINGE

## 2022-10-17 ENCOUNTER — NON-APPOINTMENT (OUTPATIENT)
Age: 77
End: 2022-10-17

## 2022-10-18 ENCOUNTER — APPOINTMENT (OUTPATIENT)
Dept: ORTHOPEDIC SURGERY | Facility: CLINIC | Age: 77
End: 2022-10-18

## 2022-10-18 PROCEDURE — 20610 DRAIN/INJ JOINT/BURSA W/O US: CPT | Mod: RT

## 2022-10-18 NOTE — PROCEDURE
[Right] : of the right [Knee] : knee [Pain] : pain [Inflammation] : inflammation [X-ray evidence of Osteoarthritis on this or prior visit] : x-ray evidence of Osteoarthritis on this or prior visit [Alcohol] : alcohol [Betadine] : betadine [Ethyl Chloride sprayed topically] : ethyl chloride sprayed topically [Sterile technique used] : sterile technique used [Other: ____] : [unfilled] [] : Patient tolerated procedure well [Call if redness, pain or fever occur] : call if redness, pain or fever occur [Apply ice for 15min out of every hour for the next 12-24 hours as tolerated] : apply ice for 15 minutes out of every hour for the next 12-24 hours as tolerated [Patient was advised to rest the joint(s) for ____ days] : patient was advised to rest the joint(s) for [unfilled] days [Previous OTC use and PT nontherapeutic] : patient has tried OTC's including aspirin, Ibuprofen, Aleve, etc or prescription NSAIDS, and/or exercises at home and/or physical therapy without satisfactory response [Patient had decreased mobility in the joint] : patient had decreased mobility in the joint [Risks, benefits, alternatives discussed / Verbal consent obtained] : the risks benefits, and alternatives have been discussed, and verbal consent was obtained [de-identified] : 25MG IN 2.5ML SINGLE USE  SYRINGE

## 2022-12-07 ENCOUNTER — RX RENEWAL (OUTPATIENT)
Age: 77
End: 2022-12-07

## 2022-12-14 ENCOUNTER — RX RENEWAL (OUTPATIENT)
Age: 77
End: 2022-12-14

## 2023-02-06 ENCOUNTER — APPOINTMENT (OUTPATIENT)
Dept: ORTHOPEDIC SURGERY | Facility: CLINIC | Age: 78
End: 2023-02-06
Payer: MEDICARE

## 2023-02-06 DIAGNOSIS — S83.411A SPRAIN OF MEDIAL COLLATERAL LIGAMENT OF RIGHT KNEE, INITIAL ENCOUNTER: ICD-10-CM

## 2023-02-06 DIAGNOSIS — S83.239A COMPLEX TEAR OF MEDIAL MENISCUS, CURRENT INJURY, UNSPECIFIED KNEE, INITIAL ENCOUNTER: ICD-10-CM

## 2023-02-06 PROCEDURE — 99213 OFFICE O/P EST LOW 20 MIN: CPT

## 2023-02-06 NOTE — DISCUSSION/SUMMARY
[de-identified] : reviewed findings, anatomy and pathology  discussed and pt understands. questions answered, pt left pleased\par f/u after MRI, michele for appointment 3 days after\par

## 2023-02-06 NOTE — PHYSICAL EXAM
[Bilateral] : knee bilaterally [NL (0)] : extension 0 degrees [5___] : hamstring 5[unfilled]/5 [Positive] : positive Lamonte [] : no obvious defects [de-identified] : stiff knee gait\par  [[id number]] : flexion 120 degrees

## 2023-02-08 ENCOUNTER — APPOINTMENT (OUTPATIENT)
Dept: CARDIOLOGY | Facility: CLINIC | Age: 78
End: 2023-02-08
Payer: MEDICARE

## 2023-02-08 VITALS
SYSTOLIC BLOOD PRESSURE: 100 MMHG | HEIGHT: 64 IN | HEART RATE: 68 BPM | DIASTOLIC BLOOD PRESSURE: 60 MMHG | BODY MASS INDEX: 23.73 KG/M2 | WEIGHT: 139 LBS | OXYGEN SATURATION: 98 %

## 2023-02-08 PROCEDURE — 99214 OFFICE O/P EST MOD 30 MIN: CPT

## 2023-02-08 RX ORDER — LISINOPRIL 2.5 MG/1
2.5 TABLET ORAL
Qty: 90 | Refills: 2 | Status: DISCONTINUED | COMMUNITY
Start: 2021-07-12 | End: 2023-02-08

## 2023-02-08 NOTE — ASSESSMENT
[FreeTextEntry1] : \par Preventive care: She did receive flu vaccine 2020. Pneumonia vaccine 2014 and subsequently 2017 ( Prevnar-13).\par \par Labs 8/25/2020. k 4.2, creat 0.78, [id number]\par 5/21/2020 TSH 2\par \par Reviewed August 3, 2021\par Labs January 29, 2021 normal LFT HDL 64 LDL 88 total cholesterol 163\par December 14, 2020 normal BMP TSH 2.17 vitamin D 68\par July 26, 2021 normal CBC.  CMP with creatinine 0.78 potassium 4.2 sodium 142 glucose 85 LFT normal TSH 1.69 Lyme titers negative.\par \par Reviewed on August 3, 2022\par EKG labs reviewed.  Labs from January 3, 2022\par \par Reviewed February 8, 2023.\par Echocardiogram carotid Doppler study reviewed

## 2023-02-08 NOTE — REASON FOR VISIT
[Symptom and Test Evaluation] : symptom and test evaluation [Hyperlipidemia] : hyperlipidemia [Hypertension] : hypertension [Other: ____] : [unfilled] [FreeTextEntry1] : 76F who presents today in routine office followup for lipid and BP management.  Reviewed echocardiogram carotid Doppler study\par PMH of HTN, HLD, murmur r/t mild VHD, hypothyroidism, OA.\par \par Intermittent dizziness.  Mainly postural.  Resolves on its own.  No associated palpitation or syncope.\par Intermittent substernal chest pain.  Nonexertional.  No radiation.  Last for few minutes.  Often resolves with burping.  No other associated symptoms\par She does have generalized mild fatigue and intermittent arthralgia based on the weather.  She had arthroscopic procedure of the knee.  Now being seen by orthopedic surgery.\par \par She remains very active, walking, working in the yard and biking at least 30mins a few times per week.\par She is compliant with her diet.  Does not have any alcohol intake.  She is non-smoker.\par She has stable weight\par Uses CPAP regularly\par No recent hospital admission

## 2023-02-08 NOTE — DISCUSSION/SUMMARY
[FreeTextEntry1] : 77 F here for routine followup/risk factor management with the following active issues and recommendations:\par \par 1.  Postural dizziness.  No significant postural hypotension.  Lower mean blood pressure.  Discontinue lisinopril.\par Echocardiogram carotid Doppler study not significant.\par Continue follow-up blood pressure\par Hydration\par \par #2 essential hypertension, lower mean blood pressure.  No significant orthostasis.  Discontinue lisinopril.  Follow closely.  Continue aggressive lifestyle modification again reviewed with her.  Goal would be to keep it less than 130/80.   Continue with low-salt diet lower saturated fat carbohydrate and alcohol intake.\par \par #3 hyperlipidemia.  On low-dose statin.  Tolerating it well.  Improved LDL cholesterol.  Continue aggressive lifestyle and risk factor modifications.  Labs in 6 months ordered\par \par #4 hypothyroidism.  Stable TSH .  Continue Synthroid.\par  \par #5 sleep apnea, very well-controlled. Continue CPAP use.\par \par #6 preventive care reviewed. She will follow with gastroenterology. Ophthalmology. Endocrinology. Gynecologist.  She got her Covid vaccines.\par \par #7 significant osteoarthritis.  Follow-up with ortho specialist\par \par \par Counseling regarding low saturated fat, salt and carbohydrate intake was reviewed. Active lifestyle and regular. Exercise along with weight management is advised.\par \par All the above were at length reviewed. Answered all the questions. Thank you very much for this kind referral. Please do not hesitate to give me a call for any question.\par Part of this transcription was done with voice recognition software and phonetically similar errors are common. I apologize for that. Please do not hesitate to call for any questions due to above.\par \par Sincerely,\par Angel Haskins MD,FACC,FASE\par

## 2023-02-08 NOTE — PHYSICAL EXAM
[Well Developed] : well developed [Well Nourished] : well nourished [No Acute Distress] : no acute distress [Normal Venous Pressure] : normal venous pressure [No Carotid Bruit] : no carotid bruit [Normal S1, S2] : normal S1, S2 [No Rub] : no rub [No Gallop] : no gallop [Clear Lung Fields] : clear lung fields [Good Air Entry] : good air entry [No Respiratory Distress] : no respiratory distress  [Normal Gait] : normal gait [No Edema] : no edema [No Cyanosis] : no cyanosis [No Clubbing] : no clubbing [No Varicosities] : no varicosities [Normal Radial B/L] : normal radial B/L [Normal DP B/L] : normal DP B/L [Moves all extremities] : moves all extremities [Normal Speech] : normal speech [Alert and Oriented] : alert and oriented [de-identified] : Midsystolic murmur

## 2023-02-08 NOTE — REVIEW OF SYSTEMS
[Feeling Fatigued] : feeling fatigued [Joint Pain] : joint pain [Joint Stiffness] : joint stiffness [Negative] : Heme/Lymph [Fever] : no fever [Headache] : no headache [Weight Gain (___ Lbs)] : no recent weight gain [Chills] : no chills [Weight Loss (___ Lbs)] : no recent weight loss [FreeTextEntry2] : As per HPI [FreeTextEntry5] : As per HPI

## 2023-02-08 NOTE — CARDIOLOGY SUMMARY
[___] : [unfilled] [LVEF ___%] : LVEF [unfilled]% [None] : no pulmonary hypertension [Normal] : normal LA size [Mild] : mild mitral regurgitation [de-identified] : August 3, 2021.  Normal sinus rhythm.  Low voltage\par August 3, 2022 normal sinus rhythm.  Low voltage [de-identified] : ett 9/22 nonischemic 6 min, 7 mets [de-identified] : August 27, 2020 EF 65% minimal mitral regurgitation PASP 31\par September 2022.  EF 60% mild mitral and aortic regurgitation.  Normal left atrium.  Pulmonary pressures at 35 mmHg [de-identified] : Bilateral carotid Doppler study September 2022.  Mild nonobstructive disease

## 2023-02-09 ENCOUNTER — FORM ENCOUNTER (OUTPATIENT)
Age: 78
End: 2023-02-09

## 2023-02-10 ENCOUNTER — APPOINTMENT (OUTPATIENT)
Dept: MRI IMAGING | Facility: CLINIC | Age: 78
End: 2023-02-10
Payer: MEDICARE

## 2023-02-10 PROCEDURE — 73721 MRI JNT OF LWR EXTRE W/O DYE: CPT | Mod: RT,MH

## 2023-02-16 ENCOUNTER — APPOINTMENT (OUTPATIENT)
Dept: ORTHOPEDIC SURGERY | Facility: CLINIC | Age: 78
End: 2023-02-16
Payer: MEDICARE

## 2023-02-16 PROCEDURE — 99213 OFFICE O/P EST LOW 20 MIN: CPT

## 2023-02-16 NOTE — PHYSICAL EXAM
[Right] : right knee [NL (0)] : extension 0 degrees [5___] : hamstring 5[unfilled]/5 [Positive] : positive Lamonte [] : no obvious defects [de-identified] : stiff knee gait\par  [FreeTextEntry9] : see mri report [TWNoteComboBox7] : flexion 120 degrees

## 2023-02-16 NOTE — DISCUSSION/SUMMARY
[de-identified] : reviewed findings, anatomy and pathology  discussed and pt understands. questions answered, pt left pleased\par After discussion of Dx & Treatment options was discuss , pt elected to Tx non operatively with exercise , medications, physical therapy and activity modification.\par

## 2023-02-16 NOTE — HISTORY OF PRESENT ILLNESS
[de-identified] : Patient presents today for follow up RT knee pain and discussion of MRI findings

## 2023-03-15 ENCOUNTER — APPOINTMENT (OUTPATIENT)
Dept: ENDOCRINOLOGY | Facility: CLINIC | Age: 78
End: 2023-03-15
Payer: MEDICARE

## 2023-03-15 VITALS
HEIGHT: 64 IN | SYSTOLIC BLOOD PRESSURE: 124 MMHG | TEMPERATURE: 98.3 F | DIASTOLIC BLOOD PRESSURE: 72 MMHG | BODY MASS INDEX: 23.73 KG/M2 | HEART RATE: 50 BPM | OXYGEN SATURATION: 97 % | WEIGHT: 139 LBS

## 2023-03-15 PROCEDURE — 99213 OFFICE O/P EST LOW 20 MIN: CPT

## 2023-03-15 NOTE — ASSESSMENT
[FreeTextEntry1] : Elderly white female with a past medical history of for primary hypothyroidism and also history of thyroid cysts which had resolved.  Patient currently is taking levothyroxine 50 mcg tablets every day.  She is compliant with her medications.  She clinically appears to be euthyroid.  Patient is also taking rosuvastatin, vitamin D3, and calcium supplements recommendation\par 1.  I would like to obtain the blood test which was done through the office of her cardiologist.  If the TSH level is not available then the patient will go for a blood test to check her thyroid profile.\par 2.  If her condition is stable and the TSH is within normal range then the patient will not change her thyroid medication.  I have advised her to return in approximately 6-9 9 months for follow-up evaluation.  Thank you

## 2023-03-15 NOTE — HISTORY OF PRESENT ILLNESS
[FreeTextEntry1] : 77-year-old white female with a past medical history of primary hypothyroidism and history of bilateral thyroid cysts which apparently had resolved.  She also has a history of hypertension and hyperlipidemia.  Patient is currently on levothyroxine 50 mcg tablets daily.  She denies any significant symptoms of chronic fatigue or tiredness or weight gain palpitations shortness of breath or hair loss.  Physically she is very active and she occasionally swims and walks on a regular basis.  She is still physically active and walks every day.  She denies any changes in her weight or difficulty swallowing.  She is compliant with her diet.

## 2023-05-09 ENCOUNTER — APPOINTMENT (OUTPATIENT)
Dept: ORTHOPEDIC SURGERY | Facility: CLINIC | Age: 78
End: 2023-05-09

## 2023-05-16 ENCOUNTER — APPOINTMENT (OUTPATIENT)
Dept: ORTHOPEDIC SURGERY | Facility: CLINIC | Age: 78
End: 2023-05-16
Payer: MEDICARE

## 2023-05-16 DIAGNOSIS — M19.90 UNSPECIFIED OSTEOARTHRITIS, UNSPECIFIED SITE: ICD-10-CM

## 2023-05-16 PROCEDURE — 20611 DRAIN/INJ JOINT/BURSA W/US: CPT | Mod: RT

## 2023-05-16 NOTE — PROCEDURE
[Right] : of the right [Knee] : knee [Pain] : pain [Inflammation] : inflammation [X-ray evidence of Osteoarthritis on this or prior visit] : x-ray evidence of Osteoarthritis on this or prior visit [Repeat series performed] : repeat series performed [Alcohol] : alcohol [Betadine] : betadine [Ethyl Chloride sprayed topically] : ethyl chloride sprayed topically [Sterile technique used] : sterile technique used [GenVisc 850 (25mg)] : 25mg of GenVisc 850 [#1] : series #1 [] : Patient tolerated procedure well [Call if redness, pain or fever occur] : call if redness, pain or fever occur [Apply ice for 15min out of every hour for the next 12-24 hours as tolerated] : apply ice for 15 minutes out of every hour for the next 12-24 hours as tolerated [Patient was advised to rest the joint(s) for ____ days] : patient was advised to rest the joint(s) for [unfilled] days [Previous OTC use and PT nontherapeutic] : patient has tried OTC's including aspirin, Ibuprofen, Aleve, etc or prescription NSAIDS, and/or exercises at home and/or physical therapy without satisfactory response [Patient had decreased mobility in the joint] : patient had decreased mobility in the joint [Risks, benefits, alternatives discussed / Verbal consent obtained] : the risks benefits, and alternatives have been discussed, and verbal consent was obtained

## 2023-05-23 ENCOUNTER — APPOINTMENT (OUTPATIENT)
Dept: ORTHOPEDIC SURGERY | Facility: CLINIC | Age: 78
End: 2023-05-23
Payer: MEDICARE

## 2023-05-23 PROCEDURE — 20610 DRAIN/INJ JOINT/BURSA W/O US: CPT | Mod: RT

## 2023-05-23 NOTE — PROCEDURE
[Right] : of the right [Knee] : knee [Pain] : pain [Inflammation] : inflammation [X-ray evidence of Osteoarthritis on this or prior visit] : x-ray evidence of Osteoarthritis on this or prior visit [Repeat series performed] : repeat series performed [Alcohol] : alcohol [Betadine] : betadine [Ethyl Chloride sprayed topically] : ethyl chloride sprayed topically [Sterile technique used] : sterile technique used [GenVisc 850 (25mg)] : 25mg of GenVisc 850 [#2] : series #2 [] : Patient tolerated procedure well [Call if redness, pain or fever occur] : call if redness, pain or fever occur [Apply ice for 15min out of every hour for the next 12-24 hours as tolerated] : apply ice for 15 minutes out of every hour for the next 12-24 hours as tolerated [Patient was advised to rest the joint(s) for ____ days] : patient was advised to rest the joint(s) for [unfilled] days [Previous OTC use and PT nontherapeutic] : patient has tried OTC's including aspirin, Ibuprofen, Aleve, etc or prescription NSAIDS, and/or exercises at home and/or physical therapy without satisfactory response [Patient had decreased mobility in the joint] : patient had decreased mobility in the joint [Risks, benefits, alternatives discussed / Verbal consent obtained] : the risks benefits, and alternatives have been discussed, and verbal consent was obtained

## 2023-05-30 ENCOUNTER — RX RENEWAL (OUTPATIENT)
Age: 78
End: 2023-05-30

## 2023-05-30 ENCOUNTER — APPOINTMENT (OUTPATIENT)
Dept: ORTHOPEDIC SURGERY | Facility: CLINIC | Age: 78
End: 2023-05-30
Payer: MEDICARE

## 2023-05-30 DIAGNOSIS — M25.561 PAIN IN RIGHT KNEE: ICD-10-CM

## 2023-05-30 DIAGNOSIS — G89.29 PAIN IN RIGHT KNEE: ICD-10-CM

## 2023-05-30 PROCEDURE — 20610 DRAIN/INJ JOINT/BURSA W/O US: CPT | Mod: RT

## 2023-05-30 NOTE — PROCEDURE
[Right] : of the right [Knee] : knee [Pain] : pain [Inflammation] : inflammation [X-ray evidence of Osteoarthritis on this or prior visit] : x-ray evidence of Osteoarthritis on this or prior visit [Repeat series performed] : repeat series performed [Alcohol] : alcohol [Betadine] : betadine [Ethyl Chloride sprayed topically] : ethyl chloride sprayed topically [Sterile technique used] : sterile technique used [] : Patient tolerated procedure well [Call if redness, pain or fever occur] : call if redness, pain or fever occur [Apply ice for 15min out of every hour for the next 12-24 hours as tolerated] : apply ice for 15 minutes out of every hour for the next 12-24 hours as tolerated [Patient was advised to rest the joint(s) for ____ days] : patient was advised to rest the joint(s) for [unfilled] days [Previous OTC use and PT nontherapeutic] : patient has tried OTC's including aspirin, Ibuprofen, Aleve, etc or prescription NSAIDS, and/or exercises at home and/or physical therapy without satisfactory response [Patient had decreased mobility in the joint] : patient had decreased mobility in the joint [Risks, benefits, alternatives discussed / Verbal consent obtained] : the risks benefits, and alternatives have been discussed, and verbal consent was obtained

## 2023-06-30 ENCOUNTER — APPOINTMENT (OUTPATIENT)
Dept: ORTHOPEDIC SURGERY | Facility: CLINIC | Age: 78
End: 2023-06-30
Payer: MEDICARE

## 2023-06-30 DIAGNOSIS — M20.41 OTHER HAMMER TOE(S) (ACQUIRED), RIGHT FOOT: ICD-10-CM

## 2023-06-30 DIAGNOSIS — M20.42 OTHER HAMMER TOE(S) (ACQUIRED), LEFT FOOT: ICD-10-CM

## 2023-06-30 PROCEDURE — 99213 OFFICE O/P EST LOW 20 MIN: CPT

## 2023-06-30 PROCEDURE — 99203 OFFICE O/P NEW LOW 30 MIN: CPT

## 2023-06-30 NOTE — PHYSICAL EXAM
[Right] : right knee [NL (0)] : extension 0 degrees [Positive] : positive Lamonte [Bilateral] : foot and ankle bilaterally [3rd] : 3rd [4th] : 4th [5th] : 5th [Mild] : mild swelling of toe(s) [1st] : 1st [2nd] : 2nd [NL 30)] : inversion 30 degrees [NL (40)] : MTP joint DF 40 degrees [NL (20)] : MTP joint PF 20 degrees [5___] : [facility] 5[unfilled]/5 [2+] : posterior tibialis pulse: 2+ [Normal] : saphenous nerve sensation normal [] : no obvious defects [de-identified] : stiff knee gait\par  [FreeTextEntry9] : see mri report [TWNoteComboBox7] : flexion 120 degrees

## 2023-06-30 NOTE — HISTORY OF PRESENT ILLNESS
[de-identified] : Patient presents today for evaluation of Left foot pain. Patient has a bump on inside of  the second toe. Patient states this has been on going for about 6 months.

## 2023-06-30 NOTE — ASSESSMENT
[FreeTextEntry1] : I DISCUSSED TREATMENT OPTIONS AS FOLLOWS:  I DISCUSSED FINDINGS WITH MY PATIENT AND DISCUSSED FURTHER TREATMENT IF NEEDED. \par THE FOLLOWING WAS SUGGESTED:\par NSAID OF CHOICE.\par TYLENOL.\par VOLTAREN GEL APPLY 4 TIMES PER DAY TO AFFECTED AREA. \par WARM OR COLD COMPRESSES AS TOLERATED.\par PT. WAS DISCUSSED AS WELL AS HOME EXERCISE PROGRAMS.\par ELEVATE AND APPLY WARM COMPRESSES.\par OTC INSERTS TO BE WORN FROM POWER STEP OR LIKE INSERTS. .\par STEROID INJECTION DISCUSSED\par DEBRIDEMENT OF LESIONS SUB 1ST MPJ. \par RTO PRN

## 2023-08-02 ENCOUNTER — APPOINTMENT (OUTPATIENT)
Dept: CARDIOLOGY | Facility: CLINIC | Age: 78
End: 2023-08-02
Payer: MEDICARE

## 2023-08-02 VITALS
HEART RATE: 66 BPM | WEIGHT: 142 LBS | DIASTOLIC BLOOD PRESSURE: 68 MMHG | OXYGEN SATURATION: 98 % | BODY MASS INDEX: 24.24 KG/M2 | HEIGHT: 64 IN | SYSTOLIC BLOOD PRESSURE: 100 MMHG

## 2023-08-02 DIAGNOSIS — Z99.89 OBSTRUCTIVE SLEEP APNEA (ADULT) (PEDIATRIC): ICD-10-CM

## 2023-08-02 DIAGNOSIS — I65.23 OCCLUSION AND STENOSIS OF BILATERAL CAROTID ARTERIES: ICD-10-CM
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
## 2023-08-02 DIAGNOSIS — G47.33 OBSTRUCTIVE SLEEP APNEA (ADULT) (PEDIATRIC): ICD-10-CM

## 2023-08-02 DIAGNOSIS — E03.9 HYPOTHYROIDISM, UNSPECIFIED: ICD-10-CM

## 2023-08-02 PROCEDURE — 93000 ELECTROCARDIOGRAM COMPLETE: CPT

## 2023-08-02 PROCEDURE — 99214 OFFICE O/P EST MOD 30 MIN: CPT

## 2023-08-02 NOTE — ASSESSMENT
[FreeTextEntry1] : Preventive care: She did receive flu vaccine 2020. Pneumonia vaccine 2014 and subsequently 2017 ( Prevnar-13).  Labs 8/25/2020. k 4.2, creat 0.78, [id number] 5/21/2020 TSH 2  Reviewed August 3, 2021 Labs January 29, 2021 normal LFT HDL 64 LDL 88 total cholesterol 163 December 14, 2020 normal BMP TSH 2.17 vitamin D 68 July 26, 2021 normal CBC.  CMP with creatinine 0.78 potassium 4.2 sodium 142 glucose 85 LFT normal TSH 1.69 Lyme titers negative.  Reviewed on August 3, 2022 EKG labs reviewed.  Labs from January 3, 2022  Reviewed February 8, 2023. Echocardiogram carotid Doppler study reviewed  Reviewed on August 2, 2023.  EKG as noted above Labs which were done recently showed stable CBC CMP TSH hemoglobin A1c B12.  Lipid panel showed LDL cholesterol 82.

## 2023-08-02 NOTE — REASON FOR VISIT
[Symptom and Test Evaluation] : symptom and test evaluation [Hyperlipidemia] : hyperlipidemia [Hypertension] : hypertension [Other: ____] : [unfilled] [FreeTextEntry1] : 77 F who presents today in routine office followup for lipid and BP management.  Reviewed EKG. PMH of HTN, HLD, murmur r/t mild VHD, hypothyroidism, OA.  Intermittent dizziness.  Mainly postural.  Resolves on its own.  No associated palpitation or syncope. Intermittent substernal chest pain.  Nonexertional.  No radiation.  Last for few minutes.  Often resolves with burping.  No other associated symptoms She does have generalized mild fatigue and intermittent arthralgia based on the weather.  She had arthroscopic procedure of the knee.  Now being seen by orthopedic surgery.  She remains very active, walking, working in the yard and biking at least 30mins a few times per week. She is compliant with her diet.  Does not have any alcohol intake.  She is non-smoker. She has stable weight Uses CPAP regularly No recent hospital admission

## 2023-08-02 NOTE — PHYSICAL EXAM
[Well Developed] : well developed [Well Nourished] : well nourished [No Acute Distress] : no acute distress [Normal Venous Pressure] : normal venous pressure [No Carotid Bruit] : no carotid bruit [Normal S1, S2] : normal S1, S2 [No Rub] : no rub [No Gallop] : no gallop [Clear Lung Fields] : clear lung fields [Good Air Entry] : good air entry [No Respiratory Distress] : no respiratory distress  [Normal Gait] : normal gait [No Edema] : no edema [No Cyanosis] : no cyanosis [No Clubbing] : no clubbing [Normal Radial B/L] : normal radial B/L [No Varicosities] : no varicosities [Normal DP B/L] : normal DP B/L [Moves all extremities] : moves all extremities [Normal Speech] : normal speech [Alert and Oriented] : alert and oriented [de-identified] : Midsystolic murmur

## 2023-08-02 NOTE — DISCUSSION/SUMMARY
[FreeTextEntry1] : 77 F here for routine followup/risk factor management with the following active issues and recommendations:  1.  Postural dizziness.  No significant postural hypotension.  Lower mean blood pressure.  Discontinue lisinopril. Echocardiogram carotid Doppler study not significant. Continue follow-up blood pressure Hydration  #2 essential hypertension, lower mean blood pressure.  No significant orthostasis.  Discontinue lisinopril.  Improved with stopping lisinopril.  Follow closely.  Continue aggressive lifestyle modification again reviewed with her.  Goal would be to keep it less than 130/80.   Continue with low-salt diet lower saturated fat carbohydrate and alcohol intake.  #3 hyperlipidemia.  On low-dose statin.  Tolerating it well.  Improved LDL cholesterol.  Continue aggressive lifestyle and risk factor modifications.  Does not want to increase the dose of statin In presence of LDL cholesterol of greater than 80  #4 hypothyroidism.  Stable TSH .  Continue Synthroid.   #5 sleep apnea, very well-controlled. Continue CPAP use.  #6 preventive care reviewed. She will follow with gastroenterology. Ophthalmology. Endocrinology. Gynecologist.  She got her Covid vaccines.  #7 significant osteoarthritis.  Follow-up with ortho specialist  #8.  Tick bites.  Lyme titers.   Counseling regarding low saturated fat, salt and carbohydrate intake was reviewed. Active lifestyle and regular. Exercise along with weight management is advised.  All the above were at length reviewed. Answered all the questions. Thank you very much for this kind referral. Please do not hesitate to give me a call for any question. Part of this transcription was done with voice recognition software and phonetically similar errors are common. I apologize for that. Please do not hesitate to call for any questions due to above.  Sincerely, Angel Haskins MD,FACC,JESUS

## 2023-08-02 NOTE — CARDIOLOGY SUMMARY
[___] : [unfilled] [LVEF ___%] : LVEF [unfilled]% [None] : no pulmonary hypertension [Normal] : normal LA size [Mild] : mild mitral regurgitation [de-identified] : August 3, 2021.  Normal sinus rhythm.  Low voltage August 3, 2022 normal sinus rhythm.  Low voltage August 2, 2023 normal sinus rhythm [de-identified] : ett 9/22 nonischemic 6 min, 7 mets [de-identified] : August 27, 2020 EF 65% minimal mitral regurgitation PASP 31\par  September 2022.  EF 60% mild mitral and aortic regurgitation.  Normal left atrium.  Pulmonary pressures at 35 mmHg [de-identified] : Bilateral carotid Doppler study September 2022.  Mild nonobstructive disease

## 2023-08-11 ENCOUNTER — NON-APPOINTMENT (OUTPATIENT)
Age: 78
End: 2023-08-11

## 2023-08-25 ENCOUNTER — RX RENEWAL (OUTPATIENT)
Age: 78
End: 2023-08-25

## 2023-08-29 DIAGNOSIS — R94.30 ABNORMAL RESULT OF CARDIOVASCULAR FUNCTION STUDY, UNSPECIFIED: ICD-10-CM

## 2023-09-05 ENCOUNTER — APPOINTMENT (OUTPATIENT)
Dept: CARDIOLOGY | Facility: CLINIC | Age: 78
End: 2023-09-05
Payer: MEDICARE

## 2023-09-05 PROCEDURE — 78452 HT MUSCLE IMAGE SPECT MULT: CPT

## 2023-09-05 PROCEDURE — A9502: CPT

## 2023-09-05 PROCEDURE — 93015 CV STRESS TEST SUPVJ I&R: CPT

## 2023-09-08 ENCOUNTER — APPOINTMENT (OUTPATIENT)
Dept: ORTHOPEDIC SURGERY | Facility: CLINIC | Age: 78
End: 2023-09-08
Payer: MEDICARE

## 2023-09-08 PROCEDURE — 99214 OFFICE O/P EST MOD 30 MIN: CPT

## 2023-09-08 NOTE — HISTORY OF PRESENT ILLNESS
[de-identified] : Patient of Dr. Drake. Since her last visit, she got gel shots in the RT knee without any relief. Has arthritis in both knees. On the RT knee, she has a baker's cyst on the back. She would like to discuss further options.

## 2023-09-08 NOTE — PHYSICAL EXAM
[Right] : right knee [NL (0)] : extension 0 degrees [4___] : hamstring 4[unfilled]/5 [] : patient ambulates without assistive device [TWNoteComboBox7] : flexion 110 degrees

## 2023-09-08 NOTE — DISCUSSION/SUMMARY
[de-identified] : I reviewed patient's prior imaging and discussed her condition and treatment options.  Plan to start NSAIDs.  Provided information to discuss TKA next visit.  Follow up in 2 weeks.  Patient voiced understanding and agreement with the plan.

## 2023-09-19 RX ORDER — KRILL/OM-3/DHA/EPA/PHOSPHO/AST 1000-230MG
81 CAPSULE ORAL DAILY
Refills: 0 | Status: ACTIVE | COMMUNITY
Start: 2023-09-19

## 2023-09-22 ENCOUNTER — APPOINTMENT (OUTPATIENT)
Dept: ORTHOPEDIC SURGERY | Facility: CLINIC | Age: 78
End: 2023-09-22
Payer: MEDICARE

## 2023-09-22 PROCEDURE — 99213 OFFICE O/P EST LOW 20 MIN: CPT

## 2023-11-15 RX ORDER — LEVOTHYROXINE SODIUM 0.05 MG/1
50 TABLET ORAL
Qty: 90 | Refills: 3 | Status: ACTIVE | COMMUNITY
Start: 2022-10-12 | End: 1900-01-01

## 2023-12-05 ENCOUNTER — APPOINTMENT (OUTPATIENT)
Dept: ORTHOPEDIC SURGERY | Facility: CLINIC | Age: 78
End: 2023-12-05

## 2023-12-12 ENCOUNTER — APPOINTMENT (OUTPATIENT)
Dept: ORTHOPEDIC SURGERY | Facility: CLINIC | Age: 78
End: 2023-12-12

## 2023-12-19 ENCOUNTER — APPOINTMENT (OUTPATIENT)
Dept: ORTHOPEDIC SURGERY | Facility: CLINIC | Age: 78
End: 2023-12-19

## 2024-01-02 ENCOUNTER — APPOINTMENT (OUTPATIENT)
Dept: ORTHOPEDIC SURGERY | Facility: CLINIC | Age: 79
End: 2024-01-02
Payer: MEDICARE

## 2024-01-02 DIAGNOSIS — M17.0 BILATERAL PRIMARY OSTEOARTHRITIS OF KNEE: ICD-10-CM

## 2024-01-02 PROCEDURE — 73562 X-RAY EXAM OF KNEE 3: CPT | Mod: RT

## 2024-01-02 PROCEDURE — 99214 OFFICE O/P EST MOD 30 MIN: CPT

## 2024-01-02 RX ORDER — MELOXICAM 7.5 MG/1
7.5 TABLET ORAL
Qty: 30 | Refills: 0 | Status: DISCONTINUED | COMMUNITY
Start: 2023-09-08 | End: 2024-01-02

## 2024-01-02 NOTE — PHYSICAL EXAM
[Normal Coordination] : normal coordination [Normal Sensation] : normal sensation [Normal Mood and Affect] : normal mood and affect [Orientated] : orientated [Able to Communicate] : able to communicate [Well Developed] : well developed [Well Nourished] : well nourished [NL (0)] : extension 0 degrees [5___] : hamstring 5[unfilled]/5 [Equivocal] : equivocal Lamonte [Right] : right knee [Lateral] : lateral [[place]] : skyline [AP Standing] : anteroposterior standing [] : no pain with flexion [TWNoteComboBox7] : flexion 120 degrees

## 2024-01-02 NOTE — HISTORY OF PRESENT ILLNESS
[de-identified] : Patient reports that she has not seen improvement with the previous course of viscosupplementation injections in May. She is considering knee replacement surgery

## 2024-01-02 NOTE — DISCUSSION/SUMMARY
[de-identified] : I am referring this patient for total knee replacement surgery, all medical history updated at todays visit. We discussed the procedure. I reviewed all diagnostic and physical findings, the anatomy and pathology were discussed and the patient understands. All questions were answered and the patient left pleased.

## 2024-01-02 NOTE — DISCUSSION/SUMMARY
[de-identified] : I am referring this patient for total knee replacement surgery, all medical history updated at todays visit. We discussed the procedure. I reviewed all diagnostic and physical findings, the anatomy and pathology were discussed and the patient understands. All questions were answered and the patient left pleased.

## 2024-01-02 NOTE — HISTORY OF PRESENT ILLNESS
[de-identified] : Patient reports that she has not seen improvement with the previous course of viscosupplementation injections in May. She is considering knee replacement surgery

## 2024-01-02 NOTE — PHYSICAL EXAM
[Normal Coordination] : normal coordination [Normal Sensation] : normal sensation [Normal Mood and Affect] : normal mood and affect [Orientated] : orientated [Able to Communicate] : able to communicate [Well Developed] : well developed [Well Nourished] : well nourished [NL (0)] : extension 0 degrees [5___] : hamstring 5[unfilled]/5 [Equivocal] : equivocal Lamonte [Right] : right knee [Lateral] : lateral [[place]] : skyline [AP Standing] : anteroposterior standing [] : no pain with flexion [TWNoteComboBox7] : flexion 120 degrees

## 2024-01-02 NOTE — PROCEDURE
[Right] : of the right [Knee] : knee [Pain] : pain [Inflammation] : inflammation [X-ray evidence of Osteoarthritis on this or prior visit] : x-ray evidence of Osteoarthritis on this or prior visit [Repeat series performed] : repeat series performed [Alcohol] : alcohol [Betadine] : betadine [Ethyl Chloride sprayed topically] : ethyl chloride sprayed topically [Gel-Syn (16.8mg)] : 16.8mg of Gel-Syn [#1] : series #1 [] : Patient tolerated procedure well [Call if redness, pain or fever occur] : call if redness, pain or fever occur [Apply ice for 15min out of every hour for the next 12-24 hours as tolerated] : apply ice for 15 minutes out of every hour for the next 12-24 hours as tolerated [Patient was advised to rest the joint(s) for ____ days] : patient was advised to rest the joint(s) for [unfilled] days [Previous OTC use and PT nontherapeutic] : patient has tried OTC's including aspirin, Ibuprofen, Aleve, etc or prescription NSAIDS, and/or exercises at home and/or physical therapy without satisfactory response [Patient had decreased mobility in the joint] : patient had decreased mobility in the joint [Risks, benefits, alternatives discussed / Verbal consent obtained] : the risks benefits, and alternatives have been discussed, and verbal consent was obtained

## 2024-01-04 ENCOUNTER — APPOINTMENT (OUTPATIENT)
Dept: ORTHOPEDIC SURGERY | Facility: CLINIC | Age: 79
End: 2024-01-04
Payer: MEDICARE

## 2024-01-04 PROCEDURE — 99214 OFFICE O/P EST MOD 30 MIN: CPT

## 2024-01-04 NOTE — HISTORY OF PRESENT ILLNESS
[de-identified] : Patient is here today for the right knee. Patient has been referred by Dr. Drake to discuss R TKA. Patient has had knee pain for yrs. She had Right knee scope 8 yrs ago. She had CSI about a yr ago without relief as well as lubricant injections without relief.  She notes pain diffuse at the knee.  She is having difficulty stairs. She has tried NSAIDS HEP.

## 2024-01-04 NOTE — DISCUSSION/SUMMARY
[de-identified] : Extensive discussion of the options was had with the patient. This discussion included both surgical and nonsurgical options. Options including but not limited to cortisone injection, viscosupplementation, physical therapy, oral anti-inflammatories, MRI, arthroplasty and arthroscopy were discussed with the patient. We also discussed the option of observation, allowing the patient to continue rest, ice, NSAIDs and following up if the condition does not improve. Time was taken to go over any questions the patient had in regards to any of the treatment plans described. The Risks, benefits, alternatives and expectations of the proposed procedure, as well as non-operative management, were discussed at length with the patient, as well as the procedure itself and the expected recovery period. The possible need for post operative Physical Therapy was also discussed. The patient was allowed as much time as necessary to ask all appropriate questions and they were answered to the patient's satisfaction.  Risks involving the TKA were discussed and include infection, bleeding, anesthesia complications, NV injury, fracture, dislocation, DVT/PE.   A discussion was completed with the patient regarding the type of implant that is planned, including what the implant is made of.  The possibility of allergy was discussed.  The different methods of implant fixation, as well as the expected longevity of the implant was also discussed.  The bone model was used, as well as the Total Joint pamphlet.  There was amble time to address all of the patient's questions and concerns.  
23-Jun-2017

## 2024-01-04 NOTE — PHYSICAL EXAM
[Right] : right knee [de-identified] : Constitutional: The patient appears well developed, well nourished. Examination of patients ability to communicate functionally was normal.       Neurologic: Coordination is normal. Alert and oriented to time, place and person. No evidence of mood disorder, calm affect.       RIGHT      KNEE  : Inspection of the knee is as follows: moderate effusion. no ecchymosis, no streaking, no erythema, no atrophy, no deformities of the quad tendon and no deformities of patellar tendon.       Palpation of the knee is as follows: medial joint line tenderness, lateral joint line tenderness, medial facet of patella tenderness, lateral facet of patella tenderness and crepitus. no palpable masses and no increased warmth.       Knee Range of Motion is as follows in degrees:        Extension: 1   Flexion: 125        Strength examination of the knee is as follows:    Quadriceps strength is 5/5    Hamstring strength is 5/5       Ligament Stability and Special Test ligamentously stable, negative anterior draw, negative Lachman test, negative posterior draw and no varus or valgus instability.    negative McMurrays test and able to do active straight leg raise without an extensor lag.       Neurological examination of the knee is as follows: light touch is intact throughout.       Gait and function is as follows: mildly antalgic gait.  [FreeTextEntry9] : ap/lat/sunrise taken 2 days ago show severe medial joint space narrowing and signfiicant Patellar spurring, KL stage 4

## 2024-01-09 ENCOUNTER — APPOINTMENT (OUTPATIENT)
Dept: ORTHOPEDIC SURGERY | Facility: CLINIC | Age: 79
End: 2024-01-09

## 2024-01-16 ENCOUNTER — APPOINTMENT (OUTPATIENT)
Dept: ORTHOPEDIC SURGERY | Facility: CLINIC | Age: 79
End: 2024-01-16

## 2024-01-17 ENCOUNTER — APPOINTMENT (OUTPATIENT)
Dept: ENDOCRINOLOGY | Facility: CLINIC | Age: 79
End: 2024-01-17
Payer: MEDICARE

## 2024-01-17 VITALS
RESPIRATION RATE: 14 BRPM | DIASTOLIC BLOOD PRESSURE: 86 MMHG | SYSTOLIC BLOOD PRESSURE: 120 MMHG | OXYGEN SATURATION: 96 % | TEMPERATURE: 97.9 F | WEIGHT: 141 LBS | HEIGHT: 64 IN | BODY MASS INDEX: 24.07 KG/M2 | HEART RATE: 68 BPM

## 2024-01-17 PROCEDURE — 99214 OFFICE O/P EST MOD 30 MIN: CPT

## 2024-01-17 NOTE — PHYSICAL EXAM
[Alert] : alert [Well Nourished] : well nourished [No Acute Distress] : no acute distress [Well Developed] : well developed [Normal Sclera/Conjunctiva] : normal sclera/conjunctiva [EOMI] : extra ocular movement intact [No Proptosis] : no proptosis [Normal Oropharynx] : the oropharynx was normal [Thyroid Not Enlarged] : the thyroid was not enlarged [No Thyroid Nodules] : no palpable thyroid nodules [No Respiratory Distress] : no respiratory distress [No Accessory Muscle Use] : no accessory muscle use [Clear to Auscultation] : lungs were clear to auscultation bilaterally [Normal S1, S2] : normal S1 and S2 [Normal Rate] : heart rate was normal [Regular Rhythm] : with a regular rhythm [No Edema] : no peripheral edema [Pedal Pulses Normal] : the pedal pulses are present [Normal Bowel Sounds] : normal bowel sounds [Not Tender] : non-tender [Not Distended] : not distended [Soft] : abdomen soft [Normal Supraclavicular Nodes] : no supraclavicular lymphadenopathy [Normal Anterior Cervical Nodes] : no anterior cervical lymphadenopathy [Normal Posterior Cervical Nodes] : no posterior cervical lymphadenopathy [No Spinal Tenderness] : no spinal tenderness [Spine Straight] : spine straight [No Stigmata of Cushings Syndrome] : no stigmata of Cushings Syndrome [Normal Gait] : normal gait [Normal Strength/Tone] : muscle strength and tone were normal [No Rash] : no rash [Acanthosis Nigricans] : no acanthosis nigricans [Normal Reflexes] : deep tendon reflexes were 2+ and symmetric [No Tremors] : no tremors [Oriented x3] : oriented to person, place, and time [de-identified] : Deferred

## 2024-01-17 NOTE — ASSESSMENT
[FreeTextEntry1] : Elderly white female with a past medical history of a primary hypothyroidism, hyperlipidemia and osteoarthritis presents for routine follow-up.  Patient is currently on levothyroxine 50 mcg tablets every day.  Patient recently had a complete blood analysis which showed that the TSH is normal at 1.87 Free T4 is 0.74 and the complete metabolic panel is normal.  Recommendation 1.  I have advised the patient to continue with the levothyroxine 50 mcg tablets every day 2.  I also explained to her that the rosuvastatin may be one of the causes of hair loss and she will discuss this with her primary care physician. 3.  The importance of diet and exercise and maintenance of normal weight was discussed with the patient. 4.  Fall precautions were also explained to the patient.  If the patient remains stable she will return to the office in approximately 1 years time thank you

## 2024-01-17 NOTE — HISTORY OF PRESENT ILLNESS
[FreeTextEntry1] : Patient last seen 9/15/2023 most recent labs dated for 1/16/2024. c/o losing more hair since the last visit.  78-year-old white female with a past medical history of primary hypothyroidism, bilateral thyroid cysts and diffuse osteoarthritis presents for routine follow-up.  Patient is currently on levothyroxine 50 mcg tablets every day together with calcium and vitamin D supplements.  Patient main complaint is that she has been losing hair mostly on the top of the scalp.  Patient's appetite has been stable.  She denies any significant weight gain or loss.  She is noticed any chest pain at this of breath.  Physically she is active and does walk on a regular basis.  Review of systems is negative for chest pains or shortness of breath abdominal pain nausea vomiting.

## 2024-03-20 ENCOUNTER — NON-APPOINTMENT (OUTPATIENT)
Age: 79
End: 2024-03-20

## 2024-03-20 ENCOUNTER — APPOINTMENT (OUTPATIENT)
Dept: CARDIOLOGY | Facility: CLINIC | Age: 79
End: 2024-03-20
Payer: MEDICARE

## 2024-03-20 VITALS
DIASTOLIC BLOOD PRESSURE: 74 MMHG | HEIGHT: 64 IN | WEIGHT: 143 LBS | BODY MASS INDEX: 24.41 KG/M2 | SYSTOLIC BLOOD PRESSURE: 120 MMHG | HEART RATE: 75 BPM | OXYGEN SATURATION: 98 %

## 2024-03-20 DIAGNOSIS — I34.0 NONRHEUMATIC MITRAL (VALVE) INSUFFICIENCY: ICD-10-CM

## 2024-03-20 DIAGNOSIS — E78.5 HYPERLIPIDEMIA, UNSPECIFIED: ICD-10-CM

## 2024-03-20 DIAGNOSIS — I25.10 ATHEROSCLEROTIC HEART DISEASE OF NATIVE CORONARY ARTERY W/OUT ANGINA PECTORIS: ICD-10-CM

## 2024-03-20 DIAGNOSIS — Z01.810 ENCOUNTER FOR PREPROCEDURAL CARDIOVASCULAR EXAMINATION: ICD-10-CM

## 2024-03-20 DIAGNOSIS — I11.9 HYPERTENSIVE HEART DISEASE W/OUT HEART FAILURE: ICD-10-CM

## 2024-03-20 PROCEDURE — 93000 ELECTROCARDIOGRAM COMPLETE: CPT

## 2024-03-20 PROCEDURE — 99214 OFFICE O/P EST MOD 30 MIN: CPT

## 2024-03-20 NOTE — PHYSICAL EXAM
[Well Nourished] : well nourished [Well Developed] : well developed [No Acute Distress] : no acute distress [Normal Venous Pressure] : normal venous pressure [No Carotid Bruit] : no carotid bruit [Normal S1, S2] : normal S1, S2 [No Rub] : no rub [No Gallop] : no gallop [Clear Lung Fields] : clear lung fields [Good Air Entry] : good air entry [No Respiratory Distress] : no respiratory distress  [Normal Gait] : normal gait [No Edema] : no edema [No Cyanosis] : no cyanosis [No Clubbing] : no clubbing [No Varicosities] : no varicosities [Normal Radial B/L] : normal radial B/L [Moves all extremities] : moves all extremities [Alert and Oriented] : alert and oriented [Normal Speech] : normal speech [Normal DP B/L] : normal DP B/L [de-identified] : Midsystolic murmur 2/6

## 2024-03-20 NOTE — CARDIOLOGY SUMMARY
[___] : [unfilled] [LVEF ___%] : LVEF [unfilled]% [None] : no pulmonary hypertension [Normal] : normal LA size [Mild] : mild mitral regurgitation [de-identified] : August 3, 2021.  Normal sinus rhythm.  Low voltage August 3, 2022 normal sinus rhythm.  Low voltage August 2, 2023 normal sinus rhythm 3/20/24 nsr  [de-identified] : ett 9/22 nonischemic 6 min, 7 mets [de-identified] : August 27, 2020 EF 65% minimal mitral regurgitation PASP 31\par  September 2022.  EF 60% mild mitral and aortic regurgitation.  Normal left atrium.  Pulmonary pressures at 35 mmHg [de-identified] : Bilateral carotid Doppler study September 2022.  Mild nonobstructive disease

## 2024-03-20 NOTE — REASON FOR VISIT
[Symptom and Test Evaluation] : symptom and test evaluation [Hyperlipidemia] : hyperlipidemia [Hypertension] : hypertension [Other: ____] : [unfilled] [FreeTextEntry1] : 78 F who presents today in routine office followup for lipid and BP management.  Reviewed EKG. Preop for right knee replacement  PMH of HTN, HLD, murmur r/t mild VHD, hypothyroidism, OA.  Intermittent dizziness.  Mainly postural.  Resolves on its own.  No associated palpitation or syncope. Intermittent substernal chest pain.  Nonexertional.  No radiation.  Last for few minutes.  Often resolves with burping.  No other associated symptoms She does have generalized mild fatigue and intermittent arthralgia based on the weather.  She had arthroscopic procedure of the knee.  Now being seen by orthopedic surgery.  She remains very active, walking, working in the yard and biking at least 30mins a few times per week. She is compliant with her diet.  Does not have any alcohol intake.  She is non-smoker. She has stable weight Uses CPAP regularly No recent hospital admission

## 2024-03-20 NOTE — REVIEW OF SYSTEMS
[Feeling Fatigued] : feeling fatigued [Joint Pain] : joint pain [Joint Stiffness] : joint stiffness [Negative] : Psychiatric [Fever] : no fever [Headache] : no headache [Weight Gain (___ Lbs)] : no recent weight gain [Weight Loss (___ Lbs)] : no recent weight loss [Chills] : no chills [FreeTextEntry5] : As per HPI [FreeTextEntry2] : As per HPI

## 2024-03-20 NOTE — ASSESSMENT
[FreeTextEntry1] : Preventive care: She did receive flu vaccine 2020. Pneumonia vaccine 2014 and subsequently 2017 ( Prevnar-13).  Labs 8/25/2020. k 4.2, creat 0.78, [id number] 5/21/2020 TSH 2  Reviewed August 3, 2021 Labs January 29, 2021 normal LFT HDL 64 LDL 88 total cholesterol 163 December 14, 2020 normal BMP TSH 2.17 vitamin D 68 July 26, 2021 normal CBC.  CMP with creatinine 0.78 potassium 4.2 sodium 142 glucose 85 LFT normal TSH 1.69 Lyme titers negative.  Reviewed on August 3, 2022 EKG labs reviewed.  Labs from January 3, 2022  Reviewed February 8, 2023. Echocardiogram carotid Doppler study reviewed  Reviewed on August 2, 2023.  EKG as noted above Labs which were done recently showed stable CBC CMP TSH hemoglobin A1c B12.  Lipid panel showed LDL cholesterol 82.   reviewed 3/20/24 ekg today nsr   labs stable 3/24

## 2024-03-20 NOTE — DISCUSSION/SUMMARY
[FreeTextEntry1] : 78 F here for routine followup/risk factor management with the following active issues and recommendations:  At present, there are no active cardiac conditions.for knww replacement right.  No recent unstable coronary syndrome, decompensated heart failure, severe valvular heart disease or significant dysrhythmias. The clinical benefit of the proposed procedure outweighs the associated cardiovascular risk. Risk not attenuated with further cardiovascular testing. Prior testing as outlined above. Optimized from a cardiovascular perspective. Control blood pressure, heart rate, pulse oximetry perioperatively. If required appropriate intravenous medication for the outpatient oral medication for blood pressure, heart rate control. DVT prophylaxis as per indication.   1.  Postural dizziness.  No significant postural hypotension.  Lower mean blood pressure.   Echocardiogram carotid Doppler study not significant. Continue follow-up blood pressure Hydration  #2 essential hypertension, lower mean blood pressure.  No significant orthostasis.  Improved with stopping lisinopril.  Follow closely.  Continue aggressive lifestyle modification again reviewed with her.  Goal would be to keep it less than 130/80.   Continue with low-salt diet lower saturated fat carbohydrate and alcohol intake.  #3 hyperlipidemia.  On low-dose statin.  Tolerating it well.  Improved LDL cholesterol.  Continue aggressive lifestyle and risk factor modifications.  Does not want to increase the dose of statin In presence of LDL cholesterol of greater than 80  #4 hypothyroidism.  Stable TSH .  Continue Synthroid.   #5 sleep apnea, very well-controlled. Continue CPAP use.  #6 preventive care reviewed. She will follow with gastroenterology. Ophthalmology. Endocrinology. Gynecologist.  She got her Covid vaccines.   Counseling regarding low saturated fat, salt and carbohydrate intake was reviewed. Active lifestyle and regular. Exercise along with weight management is advised.  All the above were at length reviewed. Answered all the questions. Thank you very much for this kind referral. Please do not hesitate to give me a call for any question. Part of this transcription was done with voice recognition software and phonetically similar errors are common. I apologize for that. Please do not hesitate to call for any questions due to above.  Sincerely, Angel Haskins MD,[facility]  [EKG obtained to assist in diagnosis and management of assessed problem(s)] : EKG obtained to assist in diagnosis and management of assessed problem(s)

## 2024-03-27 ENCOUNTER — NON-APPOINTMENT (OUTPATIENT)
Age: 79
End: 2024-03-27

## 2024-03-28 ENCOUNTER — RX RENEWAL (OUTPATIENT)
Age: 79
End: 2024-03-28

## 2024-03-28 RX ORDER — ROSUVASTATIN CALCIUM 20 MG/1
20 TABLET, FILM COATED ORAL
Qty: 90 | Refills: 3 | Status: ACTIVE | COMMUNITY
Start: 2024-03-28 | End: 1900-01-01

## 2024-04-02 ENCOUNTER — RESULT REVIEW (OUTPATIENT)
Age: 79
End: 2024-04-02

## 2024-04-02 ENCOUNTER — APPOINTMENT (OUTPATIENT)
Dept: ORTHOPEDIC SURGERY | Facility: HOSPITAL | Age: 79
End: 2024-04-02
Payer: MEDICARE

## 2024-04-02 PROCEDURE — 20985 CPTR-ASST DIR MS PX: CPT

## 2024-04-02 PROCEDURE — 20610 DRAIN/INJ JOINT/BURSA W/O US: CPT | Mod: 59,RT

## 2024-04-02 PROCEDURE — 27447 TOTAL KNEE ARTHROPLASTY: CPT | Mod: RT

## 2024-04-02 PROCEDURE — 27447 TOTAL KNEE ARTHROPLASTY: CPT | Mod: AS,RT

## 2024-04-07 ENCOUNTER — NON-APPOINTMENT (OUTPATIENT)
Age: 79
End: 2024-04-07

## 2024-04-15 ENCOUNTER — NON-APPOINTMENT (OUTPATIENT)
Age: 79
End: 2024-04-15

## 2024-04-18 ENCOUNTER — APPOINTMENT (OUTPATIENT)
Dept: ORTHOPEDIC SURGERY | Facility: CLINIC | Age: 79
End: 2024-04-18
Payer: MEDICARE

## 2024-04-18 PROCEDURE — 73560 X-RAY EXAM OF KNEE 1 OR 2: CPT | Mod: RT

## 2024-04-18 PROCEDURE — 99024 POSTOP FOLLOW-UP VISIT: CPT

## 2024-04-18 NOTE — PHYSICAL EXAM
[Right] : right knee [Components well fixed, in good position] : Components well fixed, in good position [de-identified] : Constitutional: The patient appears well developed, well nourished.       Neurologic: Coordination is normal. Alert and oriented to time, place and person. No evidence of mood disorder, calm affect.         RIGHT    KNEE: Inspection of the knee is as follows: moderate effusion and small ecchymosis. no streaking, no erythema, no atrophy, no deformities of the quad tendon and no deformities of patellar tendon.     HEALING BLISTER SCABS NOTED IWTHOUT ANY SIGNS OF INFECTION  Inspection of the wound reveals clean and dry incision, no fluctuance, no sign of infection, no erythema and no drainage. Mesh/Sutures were removed.      Palpation of the knee is as follows: no increased warmth.      Knee Range of Motion is as follows in degrees:        Extension: 2 soft end point   Flexion: 95       Strength examination of the knee is as follows:    Quadriceps strength is 5/5    Hamstring strength is 5/5       Ligament Stability and Special Test ligamentously stable and no varus or valgus instability. patella tracks well and able to do active straight leg raise without an extensor lag.       Neurological examination of the knee is as follows: light touch is intact throughout.       Gait and function is as follows: mildly antalgic gait.  CANE

## 2024-04-18 NOTE — HISTORY OF PRESENT ILLNESS
[de-identified] : following up for the right knee. Patient is s/p R TKA 4/02/2024.  Patient states the knee blisters were draining but have resolved.  She denies any fever or chills. She has been working with PT and progressing well.  She has noted somep ain at the ankle hardware she had from 16 yr ago.

## 2024-04-18 NOTE — DISCUSSION/SUMMARY
[de-identified] : She will stop the ASA BID  and continue the TEDS for 2 more weeks. continue PT WBAT f/u in 2 mos. She's going to go get a STAT doppler to r/o DVT. We will call her once we receive the results.

## 2024-05-16 ENCOUNTER — APPOINTMENT (OUTPATIENT)
Dept: ORTHOPEDIC SURGERY | Facility: CLINIC | Age: 79
End: 2024-05-16
Payer: MEDICARE

## 2024-05-16 DIAGNOSIS — M17.11 UNILATERAL PRIMARY OSTEOARTHRITIS, RIGHT KNEE: ICD-10-CM

## 2024-05-16 PROCEDURE — 99024 POSTOP FOLLOW-UP VISIT: CPT

## 2024-05-16 NOTE — HISTORY OF PRESENT ILLNESS
[de-identified] : following up for the right knee. Patient is s/p R TKA 4/02/2024. Patient states approx 2 weesk ago she developed a rash.  She notes swelling of the knee. She has been using calamine lotion and benadryl which she states the ale makes her feel like a zombie.  She still notes swelling of the Right knee. She is working with PT and progressing

## 2024-05-16 NOTE — DISCUSSION/SUMMARY
[de-identified] : She will keep the wound clean and dry. just soap and water cleaning. Continue PT WBAT with no restrictions. She will f/u in 2 mos.

## 2024-05-16 NOTE — PHYSICAL EXAM
[de-identified] : Constitutional: The patient appears well developed, well nourished.       Neurologic: Coordination is normal. Alert and oriented to time, place and person. No evidence of mood disorder, calm affect.         RIGHT    KNEE: Inspection of the knee is as follows: moderate effusion and NO  ecchymosis. no streaking, no erythema, no atrophy, no deformities of the quad tendon and no deformities of patellar tendon.     THERE IS LIGHT ERYTHEMATOUS MACULAR RASH MEDIAL KNEE WITHOUT ANY SIGNS OF INFECTION   Inspection of the wound reveals WOUNDS WELL HEALED NO SIGNS OF INFECTION NNOTED     Palpation of the knee is as follows: no increased warmth.      Knee Range of Motion is as follows in degrees:        Extension: 1 soft end point   Flexion: 100       Strength examination of the knee is as follows:    Quadriceps strength is 5/5    Hamstring strength is 5/5       Ligament Stability and Special Test ligamentously stable and no varus or valgus instability. patella tracks well and able to do active straight leg raise without an extensor lag.       Neurological examination of the knee is as follows: light touch is intact throughout.       Gait and function is as follows: mildly antalgic gait.

## 2024-06-05 ENCOUNTER — APPOINTMENT (OUTPATIENT)
Dept: CARDIOLOGY | Facility: CLINIC | Age: 79
End: 2024-06-05
Payer: MEDICARE

## 2024-06-05 VITALS
HEIGHT: 64 IN | WEIGHT: 142 LBS | SYSTOLIC BLOOD PRESSURE: 120 MMHG | OXYGEN SATURATION: 97 % | HEART RATE: 67 BPM | BODY MASS INDEX: 24.24 KG/M2 | DIASTOLIC BLOOD PRESSURE: 68 MMHG

## 2024-06-05 DIAGNOSIS — R60.0 LOCALIZED EDEMA: ICD-10-CM

## 2024-06-05 PROCEDURE — 93000 ELECTROCARDIOGRAM COMPLETE: CPT

## 2024-06-05 PROCEDURE — 99214 OFFICE O/P EST MOD 30 MIN: CPT

## 2024-06-05 RX ORDER — FUROSEMIDE 20 MG/1
20 TABLET ORAL
Qty: 90 | Refills: 1 | Status: ACTIVE | COMMUNITY
Start: 2024-06-05 | End: 1900-01-01

## 2024-06-05 NOTE — HISTORY OF PRESENT ILLNESS
[FreeTextEntry1] : STUART TILLEY  is a 78 year F  who presents today Jun 05, 2024 in clinical follow-up. Status post knee replacement and continues to have lower extremity swelling. A venous duplex was completed to rule out DVT which was negative. Using compression stockings. There is increased salt intake. Continues to participate in PT.   Today she denies chest pain, pressure, unusual shortness of breath, lightheadedness, dizziness, near syncope or syncope.   Overall she has been feeling well. There has been no recent illness or hospital stay. Medications have remained unchanged. Asymptomatic from cardiovascular and arrhythmia standpoint.   PMH of HTN, HLD, murmur r/t mild VHD, hypothyroidism, OA.

## 2024-06-05 NOTE — DISCUSSION/SUMMARY
[FreeTextEntry1] : STUART TILLEY  is a 78 year F  who presents today Jun 05, 2024 with the above history and the following active issues.   Essential hypertension Continue aggressive lifestyle modification again reviewed with her.   Goal would be to keep it less than 130/80.   Continue with low-salt diet lower saturated fat carbohydrate and alcohol intake.  Hyperlipidemia.  On low-dose statin.  Tolerating it well.  Improved LDL cholesterol.  Continue aggressive lifestyle and risk factor modifications.  Does not want to increase the dose of statin In presence of LDL cholesterol of greater than 80  Hypothyroidism.  Stable TSH .  Continue Synthroid.   Sleep apnea, very well-controlled. Continue CPAP use.  Lower extremity swelling  Recommend the following - follow-up echo for resting heart structure and function - labs including CMP/BNP - Lasix 20mg QD for 1 week - elevate lower extremity - use compression stockings - reduce salt intake  Red flag symptoms which would warrant sooner emergent evaluation reviewed with the patient.  Questions and concerns were addressed and answered.   Sincerely,  Alisa Davies PA-C Patients history, testing and plan reviewed with supervising MD: Dr. Patrick Valentino

## 2024-06-05 NOTE — REVIEW OF SYSTEMS
[Feeling Fatigued] : feeling fatigued [Joint Pain] : joint pain [Joint Stiffness] : joint stiffness [Negative] : Heme/Lymph [Fever] : no fever [Headache] : no headache [Chills] : no chills [Weight Gain (___ Lbs)] : no recent weight gain [Weight Loss (___ Lbs)] : no recent weight loss [FreeTextEntry2] : As per HPI [FreeTextEntry5] : As per HPI

## 2024-06-05 NOTE — PHYSICAL EXAM
[Well Developed] : well developed [Well Nourished] : well nourished [No Acute Distress] : no acute distress [Normal Venous Pressure] : normal venous pressure [No Carotid Bruit] : no carotid bruit [Normal S1, S2] : normal S1, S2 [No Rub] : no rub [No Gallop] : no gallop [Clear Lung Fields] : clear lung fields [Good Air Entry] : good air entry [No Respiratory Distress] : no respiratory distress  [Normal Gait] : normal gait [No Edema] : no edema [No Cyanosis] : no cyanosis [No Clubbing] : no clubbing [No Varicosities] : no varicosities [Normal Radial B/L] : normal radial B/L [Normal DP B/L] : normal DP B/L [Normal Speech] : normal speech [Alert and Oriented] : alert and oriented [de-identified] : bilateral lower ext swelling [de-identified] : Midsystolic murmur 2/6

## 2024-06-06 ENCOUNTER — APPOINTMENT (OUTPATIENT)
Dept: ORTHOPEDIC SURGERY | Facility: CLINIC | Age: 79
End: 2024-06-06

## 2024-07-03 ENCOUNTER — APPOINTMENT (OUTPATIENT)
Dept: CARDIOLOGY | Facility: CLINIC | Age: 79
End: 2024-07-03
Payer: MEDICARE

## 2024-07-03 VITALS
HEART RATE: 66 BPM | OXYGEN SATURATION: 98 % | SYSTOLIC BLOOD PRESSURE: 110 MMHG | WEIGHT: 140 LBS | BODY MASS INDEX: 23.9 KG/M2 | HEIGHT: 64 IN | DIASTOLIC BLOOD PRESSURE: 60 MMHG

## 2024-07-03 DIAGNOSIS — E78.5 HYPERLIPIDEMIA, UNSPECIFIED: ICD-10-CM

## 2024-07-03 DIAGNOSIS — I34.0 NONRHEUMATIC MITRAL (VALVE) INSUFFICIENCY: ICD-10-CM

## 2024-07-03 DIAGNOSIS — M79.89 OTHER SPECIFIED SOFT TISSUE DISORDERS: ICD-10-CM

## 2024-07-03 DIAGNOSIS — R60.0 LOCALIZED EDEMA: ICD-10-CM

## 2024-07-03 DIAGNOSIS — I11.9 HYPERTENSIVE HEART DISEASE W/OUT HEART FAILURE: ICD-10-CM

## 2024-07-03 PROCEDURE — 93356 MYOCRD STRAIN IMG SPCKL TRCK: CPT

## 2024-07-03 PROCEDURE — 99214 OFFICE O/P EST MOD 30 MIN: CPT

## 2024-07-03 PROCEDURE — 93306 TTE W/DOPPLER COMPLETE: CPT

## 2024-07-08 ENCOUNTER — NON-APPOINTMENT (OUTPATIENT)
Age: 79
End: 2024-07-08

## 2024-07-18 ENCOUNTER — APPOINTMENT (OUTPATIENT)
Dept: ORTHOPEDIC SURGERY | Facility: CLINIC | Age: 79
End: 2024-07-18
Payer: MEDICARE

## 2024-07-18 DIAGNOSIS — M17.11 UNILATERAL PRIMARY OSTEOARTHRITIS, RIGHT KNEE: ICD-10-CM

## 2024-07-18 PROCEDURE — 99214 OFFICE O/P EST MOD 30 MIN: CPT

## 2024-07-18 RX ORDER — MELOXICAM 7.5 MG/1
7.5 TABLET ORAL
Qty: 30 | Refills: 0 | Status: ACTIVE | COMMUNITY
Start: 2024-07-18 | End: 1900-01-01

## 2024-08-07 ENCOUNTER — APPOINTMENT (OUTPATIENT)
Dept: VASCULAR SURGERY | Facility: CLINIC | Age: 79
End: 2024-08-07

## 2024-08-07 PROBLEM — M25.562 PAIN IN JOINT INVOLVING LEFT LOWER LEG: Status: ACTIVE | Noted: 2024-08-07

## 2024-08-07 PROCEDURE — 99203 OFFICE O/P NEW LOW 30 MIN: CPT

## 2024-08-07 NOTE — REVIEW OF SYSTEMS
[Leg Claudication] : no intermittent leg claudication [Lower Ext Edema] : lower extremity edema [Arthralgias] : arthralgias [Joint Stiffness] : joint stiffness [Limb Pain] : limb pain [Limb Swelling] : limb swelling [Negative] : Neurological

## 2024-08-07 NOTE — HISTORY OF PRESENT ILLNESS
[FreeTextEntry1] : 78-year-old white female who is being referred by Dr. Angel Haskins to be evaluated for the left leg pain.  Patient started complaining about pain about 1 year ago.  It is located over the lateral aspect of the knee and shin area.  She had venous ultrasound done which was negative for DVT.  Patient was found to have relatively large Baker's cyst.  She underwent right total knee replacement in March.  Pain gets better with walking.

## 2024-08-07 NOTE — PHYSICAL EXAM
[JVD] : no jugular venous distention  [Normal Breath Sounds] : Normal breath sounds [Carotid Bruits] : no carotid bruits [Normal Heart Sounds] : normal heart sounds [2+] : left 2+ [Ankle Swelling (On Exam)] : not present [Varicose Veins Of Lower Extremities] : not present [] : not present [Abdomen Masses] : No abdominal masses [No HSM] : no hepatosplenomegaly [Abdomen Tenderness] : ~T ~M No abdominal tenderness [No Rash or Lesion] : No rash or lesion [Alert] : not alert [Calm] : calm [de-identified] : Very pleasant, younger looking for her age. [de-identified] : Osteoarthritic deformities of the left knee

## 2024-08-27 ENCOUNTER — APPOINTMENT (OUTPATIENT)
Dept: ORTHOPEDIC SURGERY | Facility: CLINIC | Age: 79
End: 2024-08-27
Payer: MEDICARE

## 2024-08-27 DIAGNOSIS — M25.562 PAIN IN LEFT KNEE: ICD-10-CM

## 2024-08-27 DIAGNOSIS — G89.29 PAIN IN LEFT KNEE: ICD-10-CM

## 2024-08-27 DIAGNOSIS — M25.462 EFFUSION, LEFT KNEE: ICD-10-CM

## 2024-08-27 PROCEDURE — 99214 OFFICE O/P EST MOD 30 MIN: CPT | Mod: 25

## 2024-08-27 PROCEDURE — 20611 DRAIN/INJ JOINT/BURSA W/US: CPT | Mod: LT

## 2024-08-27 NOTE — DISCUSSION/SUMMARY
[de-identified] : I reviewed all diagnostic and physical findings, the anatomy and pathology were discussed and the patient understands. All questions were answered and the patient left pleased. After discussion of diagnosis and treatment options, this patient has elected to treat non-operatively with exercises, medications, physical therapy and activity modification. refer to Dr gresham

## 2024-08-27 NOTE — HISTORY OF PRESENT ILLNESS
[de-identified] : Patient presents for LT leg pain evaluation. Patient states she has pain in her LT lower leg in the calf area. Patient states she has swelling in the calf as well. Patient states she has pain of the lateral side of the knee down to her ankle. Patient states that the pain started in May. Patient had her RT knee replaced in April. Patient states she is taking Tylenol prn.

## 2024-08-27 NOTE — PROCEDURE
[Left] : of the left [Knee] : knee [Pain] : pain [Inflammation] : inflammation [Alcohol] : alcohol [Betadine] : betadine [Ethyl Chloride sprayed topically] : ethyl chloride sprayed topically [Sterile technique used] : sterile technique used [Effusion] : effusion [] : Patient tolerated procedure well [Call if redness, pain or fever occur] : call if redness, pain or fever occur [Apply ice for 15min out of every hour for the next 12-24 hours as tolerated] : apply ice for 15 minutes out of every hour for the next 12-24 hours as tolerated [Patient was advised to rest the joint(s) for ____ days] : patient was advised to rest the joint(s) for [unfilled] days [Previous OTC use and PT nontherapeutic] : patient has tried OTC's including aspirin, Ibuprofen, Aleve, etc or prescription NSAIDS, and/or exercises at home and/or physical therapy without satisfactory response [Patient had decreased mobility in the joint] : patient had decreased mobility in the joint [Risks, benefits, alternatives discussed / Verbal consent obtained] : the risks benefits, and alternatives have been discussed, and verbal consent was obtained [All ultrasound images have been permanently captured and stored accordingly in our picture archiving and communication system] : All ultrasound images have been permanently captured and stored accordingly in our picture archiving and communication system [Visualization of the needle and placement of injection was performed without complication] : visualization of the needle and placement of injection was performed without complication [de-identified] : Large Joint Aspiration [de-identified] : 40cc [de-identified] : yellow

## 2024-08-27 NOTE — PHYSICAL EXAM
[Left] : left knee [NL (0)] : extension 0 degrees [5___] : hamstring 5[unfilled]/5 [Equivocal] : equivocal Lamonte [] : no deformities of patellar tendon [TWNoteComboBox7] : flexion 90 degrees

## 2024-09-05 ENCOUNTER — APPOINTMENT (OUTPATIENT)
Dept: ORTHOPEDIC SURGERY | Facility: CLINIC | Age: 79
End: 2024-09-05
Payer: MEDICARE

## 2024-09-05 DIAGNOSIS — M17.12 UNILATERAL PRIMARY OSTEOARTHRITIS, LEFT KNEE: ICD-10-CM

## 2024-09-05 PROCEDURE — 20610 DRAIN/INJ JOINT/BURSA W/O US: CPT | Mod: LT

## 2024-09-05 PROCEDURE — 73562 X-RAY EXAM OF KNEE 3: CPT | Mod: LT

## 2024-09-05 PROCEDURE — 99214 OFFICE O/P EST MOD 30 MIN: CPT | Mod: 25

## 2024-09-05 NOTE — DISCUSSION/SUMMARY
[de-identified] : Extensive discussion of the options was had with the patient. This discussion included both surgical and nonsurgical options. Options including but not limited to cortisone injection, visco-supplementation, physical therapy, prescription oral anti-inflammatories, were discussed with the patient. We also discussed the option of observation, allowing the patient to continue rest, ice and following up if the condition does not improve. Time was taken to go over any questions the patient had in regard to any of the treatment plans described.  Patient was given a CSI to the  left knee , advised to ice if sore and will observe over the next 24 hours for any redness, swelling or increased discomfort. The indication for this procedure was pain and inflammation. The site was prepped with betadine and sterile technique used. An injection of Lidocaine 5cc of 1% was used Betamethasone (Celestone) 1cc of 6mg.  The patient tolerated procedure well. They were advised to call if redness, pain or fever occur and apply ice for 15 minutes out of every hour for the next 12-24 hours as tolerated. The risks benefits, and alternatives have been discussed. These risks include infection, hemarthrosis, allergic reaction, bleeding and hyperglycemia. Verbal understanding and consent was obtained. There is a moderate risk of morbidity with further treatment, especially from use of prescription strength medication. The patient f/u in 1 month.  I advised the patient they can take NSAIDS OTC including 2 tabs of Naproxen 220mg BID. The patient was advised that the NSAID should be taken with food. Time was taken to discuss the importance of proper prescription drug management. Patient was advised that they should continue the Rx for the full two weeks even if their symptoms dissipate. The patient was also warned of potential risks/side effects of the medication. These potential risks include bruising, gastrointestinal bleed, gastrointestinal burning, allergic reaction and reduced blood clotting. The patient expressed verbal understanding but patient declines at this time.   The following information has been documented by Emily Sotomayor acting as a scribe. Dr. Elam Attestation The documentation recorded by the scribe, in my presence, accurately reflects the service I, Dr. Elam, personally performed, and the decisions made by me with my edits as appropriate.

## 2024-09-05 NOTE — PROCEDURE
[Large Joint Injection] : Large joint injection [Left] : of the left [Knee] : knee [Pain] : pain [Inflammation] : inflammation [Betadine] : betadine [Sterile technique used] : sterile technique used [___ cc    6mg] :  Betamethasone (Celestone) ~Vcc of 6mg [___ cc    1%] : Lidocaine ~Vcc of 1%  [Call if redness, pain or fever occur] : call if redness, pain or fever occur [] : Patient tolerated procedure well [Apply ice for 15min out of every hour for the next 12-24 hours as tolerated] : apply ice for 15 minutes out of every hour for the next 12-24 hours as tolerated [Risks, benefits, alternatives discussed / Verbal consent obtained] : the risks benefits, and alternatives have been discussed, and verbal consent was obtained

## 2024-09-05 NOTE — PHYSICAL EXAM
[Left] : left knee [de-identified] : Constitutional: The patient appears well developed, well nourished. Examination of patients ability to communicate functionally was normal.       Neurologic: Coordination is normal. Alert and oriented to time, place and person. No evidence of mood disorder, calm affect.         LEFT    KNEE  : Inspection of the knee is as follows: moderate effusion. no ecchymosis, no streaking, no erythema, no atrophy, no deformities of the quad tendon and no deformities of patellar tendon.       Palpation of the knee is as follows: medial joint line tenderness, lateral joint line tenderness, medial facet of patella tenderness, lateral facet of patella tenderness and crepitus. no palpable masses and no increased warmth.       Knee Range of Motion is as follows in degrees:        Extension: 0    Flexion: 95        Strength examination of the knee is as follows:    Quadriceps strength is 5/5    Hamstring strength is 5/5       Ligament Stability and Special Test ligamentously stable, negative anterior draw, negative Lachman test, negative posterior draw and no varus or valgus instability.    negative McMurrays test and able to do active straight leg raise without an extensor lag.       Neurological examination of the knee is as follows: light touch is intact throughout.       Gait and function is as follows: mildly antalgic gait.  [FreeTextEntry9] : ap/lat/skiers show patellar spurring.

## 2024-09-05 NOTE — HISTORY OF PRESENT ILLNESS
[de-identified] : Patient is here for left knee pain since May.   She denies any injury.  She saw Dr Drake 8/27/24 and had knee aspirated 40 cc of fluid.  She notes recurrent swelling and pain at the knee.  She has pain with ambulating.

## 2024-09-25 ENCOUNTER — APPOINTMENT (OUTPATIENT)
Dept: CARDIOLOGY | Facility: CLINIC | Age: 79
End: 2024-09-25
Payer: MEDICARE

## 2024-09-25 VITALS
HEART RATE: 70 BPM | HEIGHT: 64 IN | BODY MASS INDEX: 24.59 KG/M2 | SYSTOLIC BLOOD PRESSURE: 124 MMHG | OXYGEN SATURATION: 99 % | WEIGHT: 144 LBS | DIASTOLIC BLOOD PRESSURE: 72 MMHG

## 2024-09-25 DIAGNOSIS — I25.10 ATHEROSCLEROTIC HEART DISEASE OF NATIVE CORONARY ARTERY W/OUT ANGINA PECTORIS: ICD-10-CM

## 2024-09-25 DIAGNOSIS — I34.0 NONRHEUMATIC MITRAL (VALVE) INSUFFICIENCY: ICD-10-CM

## 2024-09-25 DIAGNOSIS — R60.0 LOCALIZED EDEMA: ICD-10-CM

## 2024-09-25 DIAGNOSIS — E78.5 HYPERLIPIDEMIA, UNSPECIFIED: ICD-10-CM

## 2024-09-25 DIAGNOSIS — I11.9 HYPERTENSIVE HEART DISEASE W/OUT HEART FAILURE: ICD-10-CM

## 2024-09-25 PROCEDURE — G2211 COMPLEX E/M VISIT ADD ON: CPT

## 2024-09-25 PROCEDURE — 99214 OFFICE O/P EST MOD 30 MIN: CPT

## 2024-09-25 NOTE — PHYSICAL EXAM
[Normal] : normal venous pressure, no carotid bruit [Normal S1, S2] : normal S1, S2 [Murmur] : murmur [Clear Lung Fields] : clear lung fields [Normal Gait] : normal gait [No Edema] : no edema [Normal Radial B/L] : normal radial B/L [Normal DP B/L] : normal DP B/L [Normal Speech] : normal speech [Alert and Oriented] : alert and oriented

## 2024-09-25 NOTE — DISCUSSION/SUMMARY
[FreeTextEntry1] : STUART TILLEY  is a 79year F  who presents today Jul 03, 2024 with the above history and the following active issues.   Essential hypertension Continue aggressive lifestyle modification again reviewed with her.   Goal would be to keep it less than 130/80.   Continue with low-salt diet lower saturated fat carbohydrate and alcohol intake.  Hyperlipidemia.  On low-dose statin.  Tolerating it well.  Improved LDL cholesterol.  Continue aggressive lifestyle and risk factor modifications.  Does not want to increase the dose of statin In presence of LDL cholesterol of greater than 80  Hypothyroidism.  Stable TSH .  Continue Synthroid.   Sleep apnea, very well-controlled. Continue CPAP use.  Left lower extremity swelling  Recommend the following - left lower ext venous duplex - elevate lower extremity - use compression stockings - reduce salt intake -Intermittent pneumatic compression.  Red flag symptoms which would warrant sooner emergent evaluation reviewed with the patient.  Questions and concerns were addressed and answered.  Counseling regarding low saturated fat,salt and carbohydrate intake was reviewed. Active lifestyle and regular exercise  along with weight management is advised. I have reviewed above at length. I answered all the questions. Patient verbalized understandings. Thank you very much for allowing me to participate in your patient's care. Please feel free to call me for any questions. Sincerely,   Angel Haskins MD, FACC, JESUS

## 2024-09-25 NOTE — HISTORY OF PRESENT ILLNESS
[FreeTextEntry1] : STUART TILLEY  is a 79 year F  who presents today in clinical follow-up. Vascular physician as well as orthopedic surgery.  Had steroid injections without any significant improvement.Continue to have left leg swelling and knee pain.  Has seen  Today she denies chest pain, pressure, unusual shortness of breath, lightheadedness, dizziness, near syncope or syncope.   Overall she has been feeling well. There has been no recent illness or hospital stay. Medications have remained unchanged. Asymptomatic from cardiovascular and arrhythmia standpoint.   PMH of HTN, HLD, murmur r/t mild VHD, hypothyroidism, OA.

## 2024-09-25 NOTE — CARDIOLOGY SUMMARY
[___] : [unfilled] [LVEF ___%] : LVEF [unfilled]% [None] : no pulmonary hypertension [Normal] : normal LA size [Mild] : mild mitral regurgitation [de-identified] : August 3, 2021.  Normal sinus rhythm.  Low voltage August 3, 2022 normal sinus rhythm.  Low voltage August 2, 2023 normal sinus rhythm 3/20/24 nsr  [de-identified] : ett 9/22 nonischemic 6 min, 7 mets [de-identified] : August 27, 2020 EF 65% minimal mitral regurgitation PASP 31\par  September 2022.  EF 60% mild mitral and aortic regurgitation.  Normal left atrium.  Pulmonary pressures at 35 mmHg [de-identified] : Bilateral carotid Doppler study September 2022.  Mild nonobstructive disease

## 2024-09-25 NOTE — CARDIOLOGY SUMMARY
[___] : [unfilled] [LVEF ___%] : LVEF [unfilled]% [None] : no pulmonary hypertension [Normal] : normal LA size [Mild] : mild mitral regurgitation [de-identified] : August 3, 2021.  Normal sinus rhythm.  Low voltage August 3, 2022 normal sinus rhythm.  Low voltage August 2, 2023 normal sinus rhythm 3/20/24 nsr  [de-identified] : ett 9/22 nonischemic 6 min, 7 mets [de-identified] : August 27, 2020 EF 65% minimal mitral regurgitation PASP 31\par  September 2022.  EF 60% mild mitral and aortic regurgitation.  Normal left atrium.  Pulmonary pressures at 35 mmHg [de-identified] : Bilateral carotid Doppler study September 2022.  Mild nonobstructive disease

## 2025-01-30 ENCOUNTER — NON-APPOINTMENT (OUTPATIENT)
Age: 80
End: 2025-01-30

## 2025-02-03 ENCOUNTER — RX RENEWAL (OUTPATIENT)
Age: 80
End: 2025-02-03

## 2025-04-02 ENCOUNTER — APPOINTMENT (OUTPATIENT)
Dept: ENDOCRINOLOGY | Facility: CLINIC | Age: 80
End: 2025-04-02
Payer: MEDICARE

## 2025-04-02 VITALS
TEMPERATURE: 97.1 F | HEART RATE: 67 BPM | SYSTOLIC BLOOD PRESSURE: 126 MMHG | HEIGHT: 64 IN | WEIGHT: 144 LBS | OXYGEN SATURATION: 99 % | BODY MASS INDEX: 24.59 KG/M2 | DIASTOLIC BLOOD PRESSURE: 72 MMHG

## 2025-04-02 DIAGNOSIS — M17.12 UNILATERAL PRIMARY OSTEOARTHRITIS, LEFT KNEE: ICD-10-CM

## 2025-04-02 PROCEDURE — 99213 OFFICE O/P EST LOW 20 MIN: CPT

## 2025-04-03 ENCOUNTER — APPOINTMENT (OUTPATIENT)
Dept: ORTHOPEDIC SURGERY | Facility: CLINIC | Age: 80
End: 2025-04-03
Payer: MEDICARE

## 2025-04-03 DIAGNOSIS — Z96.651 PRESENCE OF RIGHT ARTIFICIAL KNEE JOINT: ICD-10-CM

## 2025-04-03 PROCEDURE — 99213 OFFICE O/P EST LOW 20 MIN: CPT

## 2025-04-03 PROCEDURE — 73560 X-RAY EXAM OF KNEE 1 OR 2: CPT | Mod: RT

## 2025-04-07 RX ORDER — DOXYCYCLINE HYCLATE 100 MG/1
100 CAPSULE ORAL
Qty: 10 | Refills: 0 | Status: COMPLETED | COMMUNITY
Start: 2025-02-04

## 2025-04-08 ENCOUNTER — NON-APPOINTMENT (OUTPATIENT)
Age: 80
End: 2025-04-08

## 2025-04-08 ENCOUNTER — APPOINTMENT (OUTPATIENT)
Dept: CARDIOLOGY | Facility: CLINIC | Age: 80
End: 2025-04-08
Payer: MEDICARE

## 2025-04-08 VITALS
WEIGHT: 146 LBS | HEIGHT: 64 IN | BODY MASS INDEX: 24.92 KG/M2 | OXYGEN SATURATION: 98 % | SYSTOLIC BLOOD PRESSURE: 128 MMHG | HEART RATE: 68 BPM | DIASTOLIC BLOOD PRESSURE: 60 MMHG

## 2025-04-08 DIAGNOSIS — I11.9 HYPERTENSIVE HEART DISEASE W/OUT HEART FAILURE: ICD-10-CM

## 2025-04-08 DIAGNOSIS — G47.33 OBSTRUCTIVE SLEEP APNEA (ADULT) (PEDIATRIC): ICD-10-CM

## 2025-04-08 DIAGNOSIS — E78.5 HYPERLIPIDEMIA, UNSPECIFIED: ICD-10-CM

## 2025-04-08 DIAGNOSIS — E03.9 HYPOTHYROIDISM, UNSPECIFIED: ICD-10-CM

## 2025-04-08 DIAGNOSIS — I34.0 NONRHEUMATIC MITRAL (VALVE) INSUFFICIENCY: ICD-10-CM

## 2025-04-08 DIAGNOSIS — I25.10 ATHEROSCLEROTIC HEART DISEASE OF NATIVE CORONARY ARTERY W/OUT ANGINA PECTORIS: ICD-10-CM

## 2025-04-08 PROCEDURE — 93000 ELECTROCARDIOGRAM COMPLETE: CPT

## 2025-04-08 PROCEDURE — 99214 OFFICE O/P EST MOD 30 MIN: CPT

## 2025-04-08 PROCEDURE — G2211 COMPLEX E/M VISIT ADD ON: CPT
